# Patient Record
Sex: FEMALE | Race: WHITE | Employment: OTHER | URBAN - METROPOLITAN AREA
[De-identification: names, ages, dates, MRNs, and addresses within clinical notes are randomized per-mention and may not be internally consistent; named-entity substitution may affect disease eponyms.]

---

## 2020-05-03 ENCOUNTER — APPOINTMENT (OUTPATIENT)
Dept: CT IMAGING | Facility: CLINIC | Age: 85
End: 2020-05-03
Attending: EMERGENCY MEDICINE
Payer: COMMERCIAL

## 2020-05-03 ENCOUNTER — APPOINTMENT (OUTPATIENT)
Dept: GENERAL RADIOLOGY | Facility: CLINIC | Age: 85
End: 2020-05-03
Attending: EMERGENCY MEDICINE
Payer: COMMERCIAL

## 2020-05-03 ENCOUNTER — HOSPITAL ENCOUNTER (EMERGENCY)
Facility: CLINIC | Age: 85
Discharge: HOME | End: 2020-05-03
Attending: EMERGENCY MEDICINE | Admitting: EMERGENCY MEDICINE
Payer: COMMERCIAL

## 2020-05-03 VITALS
OXYGEN SATURATION: 93 % | DIASTOLIC BLOOD PRESSURE: 78 MMHG | RESPIRATION RATE: 16 BRPM | SYSTOLIC BLOOD PRESSURE: 143 MMHG | TEMPERATURE: 97.4 F | HEART RATE: 76 BPM

## 2020-05-03 DIAGNOSIS — S10.93XA CONTUSION OF NECK, INITIAL ENCOUNTER: ICD-10-CM

## 2020-05-03 DIAGNOSIS — S00.03XA SCALP HEMATOMA, INITIAL ENCOUNTER: ICD-10-CM
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
## 2020-05-03 DIAGNOSIS — S40.011A CONTUSION OF RIGHT SHOULDER, INITIAL ENCOUNTER: ICD-10-CM

## 2020-05-03 LAB — INR PPP: 2.11 (ref 0.86–1.14)

## 2020-05-03 PROCEDURE — 99285 EMERGENCY DEPT VISIT HI MDM: CPT | Mod: 25

## 2020-05-03 PROCEDURE — 25000132 ZZH RX MED GY IP 250 OP 250 PS 637: Performed by: EMERGENCY MEDICINE

## 2020-05-03 PROCEDURE — 73030 X-RAY EXAM OF SHOULDER: CPT | Mod: RT

## 2020-05-03 PROCEDURE — 70450 CT HEAD/BRAIN W/O DYE: CPT

## 2020-05-03 PROCEDURE — 72125 CT NECK SPINE W/O DYE: CPT

## 2020-05-03 PROCEDURE — 85610 PROTHROMBIN TIME: CPT | Performed by: EMERGENCY MEDICINE

## 2020-05-03 RX ORDER — ACETAMINOPHEN 325 MG/1
650 TABLET ORAL ONCE
Status: COMPLETED | OUTPATIENT
Start: 2020-05-03 | End: 2020-05-03

## 2020-05-03 RX ADMIN — ACETAMINOPHEN 650 MG: 325 TABLET, FILM COATED ORAL at 10:36

## 2020-05-03 ASSESSMENT — ENCOUNTER SYMPTOMS
NECK PAIN: 1
ABDOMINAL PAIN: 0
NAUSEA: 0
ARTHRALGIAS: 1
VOMITING: 0

## 2020-05-03 NOTE — ED NOTES
Called patient's daughter, Kiah to give update. Kiah states she will give patient a ride back to her apartment.

## 2020-05-03 NOTE — DISCHARGE INSTRUCTIONS
Your CT scans and X ray did not show any injury today thankfully  You need to use ice and tylenol for pain  Limit heavy lifting and activity until you feel better  It will take a week for pain to go away   Please return to the ER if you feel worse or have severe headache, pain, numbness or tingling, or anything new

## 2020-05-03 NOTE — ED PROVIDER NOTES
History     Chief Complaint:  Fall    HPI   Marleny Palomares is a 89 year old female, with a history of CHF, hypertension, atrial fibrillation and mitral valve disorder, anticoagulated on warfarin, DNR/DNI status, who presents to the emergency department for evaluation of a fall. The patient reports she was walking towards her window this morning when she slipped, lost her balance and fell, landing on her right side. She endorses neck pain and right shoulder pain, along with bruises to the right side of her head and body. She denies any hip pain, chest pain, abdominal pain, nausea or vomiting. She denies any loss of consciousness with the incident.    Allergies:  Penicillins     Medications:    Atenolol  Lasix  Ativan  Losartan  Tramadol  Warfarin  Prozac    Past Medical History:    Hypertension  Pneumonia  Anxiety  Insomnia  CHF  Chronic dizziness  Osteopenia  Mild carotid artery disease  Atrial fibrillation  Breast cancer  Mitral valve disorder    Past Surgical History:    Mitral valve replacement  Appendectomy  Cholecystectomy  Cataract removal  Mitral valvuloplasty  Thoracentesis  Pacemaker placement  Cervical fusion  Total knee arthroplasty  Breast lumpectomy  Hip replacement    Family History:    MI  Breast cancer  Stroke  Atrial fibrillation  Migraines  Macular degeneration    Social History:  Smoking status: Never  Alcohol use: No  The patient presents to the emergency department by herself.  PCP: Jaswinder Pandey  Marital Status:   [5]     Review of Systems   Cardiovascular: Negative for chest pain.   Gastrointestinal: Negative for abdominal pain, nausea and vomiting.   Musculoskeletal: Positive for arthralgias (right shoulder) and neck pain.   All other systems reviewed and are negative.      Physical Exam   Patient Vitals for the past 24 hrs:   BP Temp Temp src Pulse Resp SpO2   05/03/20 1045 -- -- -- -- -- 93 %   05/03/20 1030 (!) 143/78 -- -- 76 -- 95 %   05/03/20 1015 137/83 -- -- 85 -- --    05/03/20 1000 -- -- -- -- -- 99 %   05/03/20 0945 -- -- -- -- -- 97 %   05/03/20 0920 (!) 143/78 97.4  F (36.3  C) Oral 87 16 96 %   05/03/20 0915 (!) 143/78 -- -- -- -- --     Physical Exam  GEN- alert, cooperative  HEENT- PERRL, EOMI, MMM, oral pharynx without abnormalities, no dental injuries, midface stable, TM's clear bilaterally, small posterior scalp hematoma  NECK- ROM, soft, supple, mild lower midline C spine tenderness to palpation, no abrasions  RESP- CTAB, no w/r/r, chest wall nontender, no crepitus, symmetrical chest wall movement  CV- RRR, no m/r/g  ABD- soft, NT/ND, +BS  MSK- normal ROM in all extremities, pelvis stable to AP and lateral compression, no T and L spinal tenderness in the midline, 5/5 strength in all extremities. R shoulder TTP without deformity  NEURO- GCS 15, speech normal, alert, 5/5 strength x 4, sensation to light touch intact in all extremities,  strong bilaterally  SKIN- no rash, bruising on left neck from recent dental surgery, no ecchymosis, no abrasion  PSYCH- normal mood    Emergency Department Course   Imaging:  Radiology findings were communicated with the patient who voiced understanding of the findings.    CT Head w/o Contrast  IMPRESSION:     1. No evidence of acute intracranial hemorrhage, mass, or herniation.  2. There is generalized atrophy of the brain. White matter changes are  present in the cerebral hemispheres that are consistent with small  vessel ischemic disease in this age patient.  As read by Radiology.    CT Cervical Spine w/o Contrast  IMPRESSION:   1. No definite acute fracture or subluxation to the cervical spine.  2. Surgical screw through the C2 vertebral body through what appears  to be a chronic fracture through the base of the dens. No evidence of  fusion across the dens fracture.  3. Multilevel degenerative changes throughout the cervical spine.  As read by Radiology.    XR Shoulder Right G/E 3 Views  Findings:   Degenerative change at the  glenohumeral joint. No evidence for  fracture or dislocation. Hypertrophic change of the clavicular joint.  The right clavicle is negative for fracture. Sternotomy with cardiac  pacer wires incompletely visualized. No scapular fracture is  identified.  As read by Radiology.    Laboratory:  Laboratory findings were communicated with the patient who voiced understanding of the findings.    INR: 2.11 (H)     Interventions:  1036 Tylenol 650 mg PO    Emergency Department Course:  Patient arrived to the emergency department via EMS with a cervical collar in place.    Past medical records, nursing notes, and vitals reviewed.  0921: I performed an exam of the patient and obtained history, as documented above.     IV inserted and blood drawn.    The patient was sent for a head and cervical spine CT and right shoulder x-ray while in the emergency department, results above.     1035: I rechecked the patient. I explained findings to the patient. Cervical collar was removed secondary to negative CT scan.    I reviewed the results with the Patient and answered all related questions prior to discharge.     Findings and plan explained to the Patient. Patient discharged home with instructions regarding supportive care, medications, and reasons to return. The importance of close follow-up was reviewed.   Impression & Plan   Medical Decision Making:  Patient presents after a fall where she was walking towards a window to open it. She did not have any loss of consciousness but did have a hematoma to the back of her head. She is on Coumadin, therefore a CT was performed. Patient thankfully had no injuries and her INR was 2.11 today. She was given Tylenol after we took off her cervical collar. We did discuss her results with her family. Patient is discharged to the care of her daughter who will take her home. Patient was ambulatory and therefore was discharged.    Diagnosis:    ICD-10-CM   1. Contusion of right shoulder, initial  encounter  S40.011A   2. Contusion of neck, initial encounter  S10.93XA   3. Scalp hematoma, initial encounter  S00.03XA   4. Fall, initial encounter  W19.XXXA     Disposition:  Discharged to home.    Lora Aparicio  5/3/2020   [facility] EMERGENCY DEPARTMENT  Scribe Disclosure:  I, Lora Aparicio, am serving as a scribe at 9:21 AM on 5/3/2020 to document services personally performed by Huyen Watters MD based on my observations and the provider's statements to me.      Huyen Watters MD  05/03/20 9832

## 2020-05-03 NOTE — ED TRIAGE NOTES
89 year old female presents via EMS after fall from standing position. Patient states she lost her balance and denies chest pain, SOB, or dizziness prior to fall. ABCs intact. Patient in c-collar from EMS. Complains of head pain, posterior neck pain, and right shoulder pain.

## 2020-05-03 NOTE — ED NOTES
Bed: ED18  Expected date: 5/3/20  Expected time:   Means of arrival: Ambulance  Comments:  [id number] 89yo F fall

## 2020-09-25 ENCOUNTER — APPOINTMENT (OUTPATIENT)
Dept: GENERAL RADIOLOGY | Facility: CLINIC | Age: 85
DRG: 536 | End: 2020-09-25
Attending: EMERGENCY MEDICINE
Payer: COMMERCIAL

## 2020-09-25 ENCOUNTER — HOSPITAL ENCOUNTER (INPATIENT)
Facility: CLINIC | Age: 85
LOS: 1 days | Discharge: ANOTHER HOSPITAL | DRG: 536 | End: 2020-09-27
Attending: EMERGENCY MEDICINE | Admitting: INTERNAL MEDICINE
Payer: COMMERCIAL

## 2020-09-25 ENCOUNTER — APPOINTMENT (OUTPATIENT)
Dept: CT IMAGING | Facility: CLINIC | Age: 85
DRG: 536 | End: 2020-09-25
Attending: EMERGENCY MEDICINE
Payer: COMMERCIAL

## 2020-09-25 DIAGNOSIS — S32.592A PUBIC RAMUS FRACTURE, LEFT, CLOSED, INITIAL ENCOUNTER (H): ICD-10-CM

## 2020-09-25 DIAGNOSIS — R26.2 UNABLE TO AMBULATE: ICD-10-CM

## 2020-09-25 DIAGNOSIS — Z79.01 ANTICOAGULATED WITH WARFARIN: ICD-10-CM

## 2020-09-25 DIAGNOSIS — R55 SYNCOPE, UNSPECIFIED SYNCOPE TYPE: ICD-10-CM

## 2020-09-25 LAB
ALBUMIN SERPL-MCNC: 4.1 G/DL (ref 3.4–5)
ALP SERPL-CCNC: 86 U/L (ref 40–150)
ALT SERPL W P-5'-P-CCNC: 23 U/L (ref 0–50)
ANION GAP SERPL CALCULATED.3IONS-SCNC: 5 MMOL/L (ref 3–14)
AST SERPL W P-5'-P-CCNC: 35 U/L (ref 0–45)
BASOPHILS # BLD AUTO: 0 10E9/L (ref 0–0.2)
BASOPHILS NFR BLD AUTO: 0.3 %
BILIRUB SERPL-MCNC: 0.9 MG/DL (ref 0.2–1.3)
BUN SERPL-MCNC: 19 MG/DL (ref 7–30)
CALCIUM SERPL-MCNC: 9.4 MG/DL (ref 8.5–10.1)
CHLORIDE SERPL-SCNC: 106 MMOL/L (ref 94–109)
CO2 SERPL-SCNC: 28 MMOL/L (ref 20–32)
CREAT SERPL-MCNC: 0.7 MG/DL (ref 0.52–1.04)
DIFFERENTIAL METHOD BLD: ABNORMAL
EOSINOPHIL # BLD AUTO: 0.1 10E9/L (ref 0–0.7)
EOSINOPHIL NFR BLD AUTO: 1.6 %
ERYTHROCYTE [DISTWIDTH] IN BLOOD BY AUTOMATED COUNT: 15.4 % (ref 10–15)
GFR SERPL CREATININE-BSD FRML MDRD: 74 ML/MIN/{1.73_M2}
GLUCOSE SERPL-MCNC: 96 MG/DL (ref 70–99)
HCT VFR BLD AUTO: 48.1 % (ref 35–47)
HGB BLD-MCNC: 14.7 G/DL (ref 11.7–15.7)
IMM GRANULOCYTES # BLD: 0 10E9/L (ref 0–0.4)
IMM GRANULOCYTES NFR BLD: 0.3 %
INR PPP: 2.01 (ref 0.86–1.14)
LACTATE BLD-SCNC: 0.9 MMOL/L (ref 0.7–2)
LYMPHOCYTES # BLD AUTO: 1.8 10E9/L (ref 0.8–5.3)
LYMPHOCYTES NFR BLD AUTO: 28.8 %
MAGNESIUM SERPL-MCNC: 2.3 MG/DL (ref 1.6–2.3)
MCH RBC QN AUTO: 27.5 PG (ref 26.5–33)
MCHC RBC AUTO-ENTMCNC: 30.6 G/DL (ref 31.5–36.5)
MCV RBC AUTO: 90 FL (ref 78–100)
MONOCYTES # BLD AUTO: 1 10E9/L (ref 0–1.3)
MONOCYTES NFR BLD AUTO: 15.5 %
NEUTROPHILS # BLD AUTO: 3.3 10E9/L (ref 1.6–8.3)
NEUTROPHILS NFR BLD AUTO: 53.5 %
NRBC # BLD AUTO: 0 10*3/UL
NRBC BLD AUTO-RTO: 0 /100
PLATELET # BLD AUTO: 233 10E9/L (ref 150–450)
POTASSIUM SERPL-SCNC: 4.1 MMOL/L (ref 3.4–5.3)
PROT SERPL-MCNC: 8 G/DL (ref 6.8–8.8)
RBC # BLD AUTO: 5.35 10E12/L (ref 3.8–5.2)
SODIUM SERPL-SCNC: 139 MMOL/L (ref 133–144)
TROPONIN I SERPL-MCNC: 0.02 UG/L (ref 0–0.04)
WBC # BLD AUTO: 6.2 10E9/L (ref 4–11)

## 2020-09-25 PROCEDURE — 85610 PROTHROMBIN TIME: CPT | Performed by: EMERGENCY MEDICINE

## 2020-09-25 PROCEDURE — 96374 THER/PROPH/DIAG INJ IV PUSH: CPT

## 2020-09-25 PROCEDURE — 93005 ELECTROCARDIOGRAM TRACING: CPT

## 2020-09-25 PROCEDURE — 84484 ASSAY OF TROPONIN QUANT: CPT | Performed by: EMERGENCY MEDICINE

## 2020-09-25 PROCEDURE — 72125 CT NECK SPINE W/O DYE: CPT

## 2020-09-25 PROCEDURE — 25800030 ZZH RX IP 258 OP 636: Performed by: EMERGENCY MEDICINE

## 2020-09-25 PROCEDURE — 73502 X-RAY EXAM HIP UNI 2-3 VIEWS: CPT

## 2020-09-25 PROCEDURE — 83605 ASSAY OF LACTIC ACID: CPT | Performed by: EMERGENCY MEDICINE

## 2020-09-25 PROCEDURE — 25000128 H RX IP 250 OP 636: Performed by: EMERGENCY MEDICINE

## 2020-09-25 PROCEDURE — 71045 X-RAY EXAM CHEST 1 VIEW: CPT

## 2020-09-25 PROCEDURE — 83735 ASSAY OF MAGNESIUM: CPT | Performed by: EMERGENCY MEDICINE

## 2020-09-25 PROCEDURE — 72072 X-RAY EXAM THORAC SPINE 3VWS: CPT

## 2020-09-25 PROCEDURE — 85025 COMPLETE CBC W/AUTO DIFF WBC: CPT | Performed by: EMERGENCY MEDICINE

## 2020-09-25 PROCEDURE — 72100 X-RAY EXAM L-S SPINE 2/3 VWS: CPT

## 2020-09-25 PROCEDURE — C9803 HOPD COVID-19 SPEC COLLECT: HCPCS

## 2020-09-25 PROCEDURE — 80053 COMPREHEN METABOLIC PANEL: CPT | Performed by: EMERGENCY MEDICINE

## 2020-09-25 PROCEDURE — 70450 CT HEAD/BRAIN W/O DYE: CPT

## 2020-09-25 PROCEDURE — 99285 EMERGENCY DEPT VISIT HI MDM: CPT | Mod: 25

## 2020-09-25 RX ORDER — LIDOCAINE 40 MG/G
CREAM TOPICAL
Status: DISCONTINUED | OUTPATIENT
Start: 2020-09-25 | End: 2020-09-26

## 2020-09-25 RX ORDER — HYDROMORPHONE HYDROCHLORIDE 1 MG/ML
0.5 INJECTION, SOLUTION INTRAMUSCULAR; INTRAVENOUS; SUBCUTANEOUS
Status: COMPLETED | OUTPATIENT
Start: 2020-09-25 | End: 2020-09-25

## 2020-09-25 RX ORDER — SODIUM CHLORIDE, SODIUM LACTATE, POTASSIUM CHLORIDE, CALCIUM CHLORIDE 600; 310; 30; 20 MG/100ML; MG/100ML; MG/100ML; MG/100ML
INJECTION, SOLUTION INTRAVENOUS CONTINUOUS
Status: DISCONTINUED | OUTPATIENT
Start: 2020-09-25 | End: 2020-09-26

## 2020-09-25 RX ADMIN — SODIUM CHLORIDE, POTASSIUM CHLORIDE, SODIUM LACTATE AND CALCIUM CHLORIDE 250 ML: 600; 310; 30; 20 INJECTION, SOLUTION INTRAVENOUS at 23:56

## 2020-09-25 RX ADMIN — HYDROMORPHONE HYDROCHLORIDE 0.5 MG: 1 INJECTION, SOLUTION INTRAMUSCULAR; INTRAVENOUS; SUBCUTANEOUS at 23:02

## 2020-09-26 ENCOUNTER — APPOINTMENT (OUTPATIENT)
Dept: SPEECH THERAPY | Facility: CLINIC | Age: 85
DRG: 536 | End: 2020-09-26
Attending: INTERNAL MEDICINE
Payer: COMMERCIAL

## 2020-09-26 PROBLEM — S32.9XXA PELVIC FRACTURE (H): Status: ACTIVE | Noted: 2020-09-26

## 2020-09-26 LAB
ALBUMIN UR-MCNC: NEGATIVE MG/DL
ANION GAP SERPL CALCULATED.3IONS-SCNC: 6 MMOL/L (ref 3–14)
APPEARANCE UR: CLEAR
BILIRUB UR QL STRIP: NEGATIVE
BUN SERPL-MCNC: 14 MG/DL (ref 7–30)
CALCIUM SERPL-MCNC: 8.4 MG/DL (ref 8.5–10.1)
CHLORIDE SERPL-SCNC: 108 MMOL/L (ref 94–109)
CO2 SERPL-SCNC: 24 MMOL/L (ref 20–32)
COLOR UR AUTO: YELLOW
CREAT SERPL-MCNC: 0.53 MG/DL (ref 0.52–1.04)
CRP SERPL-MCNC: 15.1 MG/L (ref 0–8)
GFR SERPL CREATININE-BSD FRML MDRD: 81 ML/MIN/{1.73_M2}
GLUCOSE SERPL-MCNC: 87 MG/DL (ref 70–99)
GLUCOSE UR STRIP-MCNC: NEGATIVE MG/DL
HGB UR QL STRIP: NEGATIVE
INTERPRETATION ECG - MUSE: NORMAL
KETONES UR STRIP-MCNC: 10 MG/DL
LABORATORY COMMENT REPORT: NORMAL
LEUKOCYTE ESTERASE UR QL STRIP: ABNORMAL
MAGNESIUM SERPL-MCNC: 2.1 MG/DL (ref 1.6–2.3)
MUCOUS THREADS #/AREA URNS LPF: PRESENT /LPF
NITRATE UR QL: NEGATIVE
PH UR STRIP: 5.5 PH (ref 5–7)
POTASSIUM SERPL-SCNC: 4 MMOL/L (ref 3.4–5.3)
RBC #/AREA URNS AUTO: 2 /HPF (ref 0–2)
SARS-COV-2 RNA SPEC QL NAA+PROBE: NEGATIVE
SARS-COV-2 RNA SPEC QL NAA+PROBE: NORMAL
SODIUM SERPL-SCNC: 138 MMOL/L (ref 133–144)
SOURCE: ABNORMAL
SP GR UR STRIP: 1.02 (ref 1–1.03)
SPECIMEN SOURCE: NORMAL
SPECIMEN SOURCE: NORMAL
SQUAMOUS #/AREA URNS AUTO: 5 /HPF (ref 0–1)
TROPONIN I SERPL-MCNC: 0.02 UG/L (ref 0–0.04)
UROBILINOGEN UR STRIP-MCNC: NORMAL MG/DL (ref 0–2)
WBC #/AREA URNS AUTO: 4 /HPF (ref 0–5)

## 2020-09-26 PROCEDURE — 80048 BASIC METABOLIC PNL TOTAL CA: CPT | Performed by: INTERNAL MEDICINE

## 2020-09-26 PROCEDURE — 92610 EVALUATE SWALLOWING FUNCTION: CPT | Mod: GN | Performed by: SPEECH-LANGUAGE PATHOLOGIST

## 2020-09-26 PROCEDURE — 99222 1ST HOSP IP/OBS MODERATE 55: CPT | Performed by: INTERNAL MEDICINE

## 2020-09-26 PROCEDURE — 83735 ASSAY OF MAGNESIUM: CPT | Performed by: INTERNAL MEDICINE

## 2020-09-26 PROCEDURE — 25000132 ZZH RX MED GY IP 250 OP 250 PS 637: Performed by: INTERNAL MEDICINE

## 2020-09-26 PROCEDURE — 87070 CULTURE OTHR SPECIMN AEROBIC: CPT | Performed by: INTERNAL MEDICINE

## 2020-09-26 PROCEDURE — 36415 COLL VENOUS BLD VENIPUNCTURE: CPT | Performed by: INTERNAL MEDICINE

## 2020-09-26 PROCEDURE — 86140 C-REACTIVE PROTEIN: CPT | Performed by: INTERNAL MEDICINE

## 2020-09-26 PROCEDURE — 87040 BLOOD CULTURE FOR BACTERIA: CPT | Performed by: INTERNAL MEDICINE

## 2020-09-26 PROCEDURE — 12000000 ZZH R&B MED SURG/OB

## 2020-09-26 PROCEDURE — 84484 ASSAY OF TROPONIN QUANT: CPT | Performed by: INTERNAL MEDICINE

## 2020-09-26 PROCEDURE — 25800030 ZZH RX IP 258 OP 636: Performed by: EMERGENCY MEDICINE

## 2020-09-26 PROCEDURE — U0003 INFECTIOUS AGENT DETECTION BY NUCLEIC ACID (DNA OR RNA); SEVERE ACUTE RESPIRATORY SYNDROME CORONAVIRUS 2 (SARS-COV-2) (CORONAVIRUS DISEASE [COVID-19]), AMPLIFIED PROBE TECHNIQUE, MAKING USE OF HIGH THROUGHPUT TECHNOLOGIES AS DESCRIBED BY CMS-2020-01-R: HCPCS | Performed by: INTERNAL MEDICINE

## 2020-09-26 PROCEDURE — 81001 URINALYSIS AUTO W/SCOPE: CPT | Performed by: EMERGENCY MEDICINE

## 2020-09-26 RX ORDER — FUROSEMIDE 20 MG
20 TABLET ORAL 2 TIMES DAILY
Status: DISCONTINUED | OUTPATIENT
Start: 2020-09-26 | End: 2020-09-26

## 2020-09-26 RX ORDER — VITAMIN B COMPLEX
50 TABLET ORAL DAILY
Status: DISCONTINUED | OUTPATIENT
Start: 2020-09-26 | End: 2020-09-27 | Stop reason: HOSPADM

## 2020-09-26 RX ORDER — NALOXONE HYDROCHLORIDE 0.4 MG/ML
.1-.4 INJECTION, SOLUTION INTRAMUSCULAR; INTRAVENOUS; SUBCUTANEOUS
Status: DISCONTINUED | OUTPATIENT
Start: 2020-09-26 | End: 2020-09-27 | Stop reason: HOSPADM

## 2020-09-26 RX ORDER — ACETAMINOPHEN 500 MG
500 TABLET ORAL EVERY 4 HOURS PRN
COMMUNITY

## 2020-09-26 RX ORDER — VITAMIN B COMPLEX
25 TABLET ORAL DAILY
COMMUNITY

## 2020-09-26 RX ORDER — POTASSIUM CHLORIDE 750 MG/1
10 TABLET, EXTENDED RELEASE ORAL DAILY
COMMUNITY

## 2020-09-26 RX ORDER — OXYCODONE HYDROCHLORIDE 5 MG/1
5 TABLET ORAL
Status: DISCONTINUED | OUTPATIENT
Start: 2020-09-26 | End: 2020-09-26

## 2020-09-26 RX ORDER — FUROSEMIDE 40 MG
40 TABLET ORAL DAILY
Status: DISCONTINUED | OUTPATIENT
Start: 2020-09-26 | End: 2020-09-27

## 2020-09-26 RX ORDER — CALCIUM CARBONATE 500 MG/1
1 TABLET, CHEWABLE ORAL 2 TIMES DAILY
COMMUNITY

## 2020-09-26 RX ORDER — ONDANSETRON 2 MG/ML
4 INJECTION INTRAMUSCULAR; INTRAVENOUS EVERY 6 HOURS PRN
Status: DISCONTINUED | OUTPATIENT
Start: 2020-09-26 | End: 2020-09-27 | Stop reason: HOSPADM

## 2020-09-26 RX ORDER — ONDANSETRON 4 MG/1
4 TABLET, ORALLY DISINTEGRATING ORAL EVERY 6 HOURS PRN
Status: DISCONTINUED | OUTPATIENT
Start: 2020-09-26 | End: 2020-09-27 | Stop reason: HOSPADM

## 2020-09-26 RX ORDER — ACETAMINOPHEN 325 MG/1
975 TABLET ORAL 3 TIMES DAILY
Status: DISCONTINUED | OUTPATIENT
Start: 2020-09-26 | End: 2020-09-27 | Stop reason: HOSPADM

## 2020-09-26 RX ORDER — ATENOLOL 50 MG/1
50 TABLET ORAL 2 TIMES DAILY
Status: DISCONTINUED | OUTPATIENT
Start: 2020-09-26 | End: 2020-09-27 | Stop reason: HOSPADM

## 2020-09-26 RX ORDER — LIDOCAINE 40 MG/G
CREAM TOPICAL
Status: DISCONTINUED | OUTPATIENT
Start: 2020-09-26 | End: 2020-09-27 | Stop reason: HOSPADM

## 2020-09-26 RX ORDER — LOSARTAN POTASSIUM 50 MG/1
50 TABLET ORAL DAILY
Status: DISCONTINUED | OUTPATIENT
Start: 2020-09-26 | End: 2020-09-26

## 2020-09-26 RX ORDER — LORAZEPAM 0.5 MG/1
0.5 TABLET ORAL EVERY 6 HOURS PRN
Status: DISCONTINUED | OUTPATIENT
Start: 2020-09-26 | End: 2020-09-27 | Stop reason: HOSPADM

## 2020-09-26 RX ORDER — HYDROMORPHONE HYDROCHLORIDE 1 MG/ML
.3-.5 INJECTION, SOLUTION INTRAMUSCULAR; INTRAVENOUS; SUBCUTANEOUS
Status: DISCONTINUED | OUTPATIENT
Start: 2020-09-26 | End: 2020-09-27 | Stop reason: HOSPADM

## 2020-09-26 RX ORDER — FUROSEMIDE 40 MG
40 TABLET ORAL 2 TIMES DAILY
COMMUNITY

## 2020-09-26 RX ORDER — CLINDAMYCIN HCL 300 MG
600 CAPSULE ORAL SEE ADMIN INSTRUCTIONS
COMMUNITY

## 2020-09-26 RX ORDER — WARFARIN SODIUM 4 MG/1
4 TABLET ORAL
Status: DISCONTINUED | OUTPATIENT
Start: 2020-09-26 | End: 2020-09-27

## 2020-09-26 RX ORDER — SENNOSIDES 8.6 MG
1 TABLET ORAL 2 TIMES DAILY PRN
COMMUNITY

## 2020-09-26 RX ORDER — AMOXICILLIN 250 MG
2 CAPSULE ORAL 2 TIMES DAILY PRN
Status: DISCONTINUED | OUTPATIENT
Start: 2020-09-26 | End: 2020-09-27 | Stop reason: HOSPADM

## 2020-09-26 RX ORDER — AMOXICILLIN 250 MG
1 CAPSULE ORAL 2 TIMES DAILY PRN
Status: DISCONTINUED | OUTPATIENT
Start: 2020-09-26 | End: 2020-09-27 | Stop reason: HOSPADM

## 2020-09-26 RX ORDER — WARFARIN SODIUM 2.5 MG/1
3.75 TABLET ORAL DAILY
COMMUNITY

## 2020-09-26 RX ORDER — TRAMADOL HYDROCHLORIDE 50 MG/1
50 TABLET ORAL EVERY 6 HOURS PRN
Status: DISCONTINUED | OUTPATIENT
Start: 2020-09-26 | End: 2020-09-26

## 2020-09-26 RX ADMIN — LORAZEPAM 0.5 MG: 0.5 TABLET ORAL at 14:01

## 2020-09-26 RX ADMIN — ACETAMINOPHEN 975 MG: 325 TABLET, FILM COATED ORAL at 09:51

## 2020-09-26 RX ADMIN — LORAZEPAM 0.5 MG: 0.5 TABLET ORAL at 04:14

## 2020-09-26 RX ADMIN — SODIUM CHLORIDE, POTASSIUM CHLORIDE, SODIUM LACTATE AND CALCIUM CHLORIDE: 600; 310; 30; 20 INJECTION, SOLUTION INTRAVENOUS at 00:10

## 2020-09-26 RX ADMIN — OXYCODONE HYDROCHLORIDE 5 MG: 5 TABLET ORAL at 10:07

## 2020-09-26 ASSESSMENT — ACTIVITIES OF DAILY LIVING (ADL)
ADLS_ACUITY_SCORE: 30
ADLS_ACUITY_SCORE: 30
ADLS_ACUITY_SCORE: 26
ADLS_ACUITY_SCORE: 26
ADLS_ACUITY_SCORE: 28

## 2020-09-26 ASSESSMENT — MIFFLIN-ST. JEOR: SCORE: 1045.45

## 2020-09-26 NOTE — PLAN OF CARE
"Pt admitted with a fall in her independent living facility. L hip fracture noted, pt declined any pain management. Offered analgesics and ice, declined. Pain 5/10. BP (!) 167/85   Pulse 96   Temp 97.5  F (36.4  C) (Oral)   Resp 15   Ht 1.651 m (5' 5\")   Wt 64 kg (141 lb)   SpO2 94%   BMI 23.46 kg/m   2 LPM applied for O2 sats of 89% on RA. Now 94% on 2 LPM. Pt A&O x4. Pt had a 50 beat run of NICHOLAS sifuentes MD notified. Tele otherwise A fib CVR. Ativan given x1 for reported anxiety. Pt to be seen by PT and Ortho. Will continue with poc.  "

## 2020-09-26 NOTE — H&P
[facility]  Hospitalist Admission Note  September 26, 2020  Name: Marleny Palomares    MRN: [id number]  YOB: 1926    Age: 89 year old  Date of admission: 9/25/2020  Primary care provider: Dinesh Smith      Summary:  Patient is a 89-year-old female with a history of hypertension, chronic atrial fibrillation on anticoagulation with Coumadin, generalized anxiety, who presented to us with a fall event.  Patient reports that she used to have problems with falling but has not fallen in a while.  Patient does recall the events of her fall which includes standing in the kitchen and taking the Tylenol out of the cupboard.  She remembers twisting the cap and then ended up falling down.  She reports losing her balance but did not have any other prodromal symptoms such as chest pain, palpitation, or shortness of breath.  She remembers the fall and ended up on the ground.  She lives in independent living and was able to activate her [facility] to get help.  EMS was called and she was brought in.  She was placed in a c-collar.  She had a trauma series work-up including chest x-ray, lumbar x-ray, pelvis, and thoracic x-ray.  CT of the head and C-spine was negative.  Only significant findings was positive for a left pelvic superior rami fracture.  She does come to us from independent living and will likely need to be dispositioned to TCU once pain is better controlled.  I was asked to admit her for further inpatient cares, PT, and orthopedic consultation.     Problem list/Plan:    Fall event, appears mechanical with the left superior pubic rami fracture  -Inpatient admission for better pain control, orthopedic consultation for weightbearing and need for follow-up  -PT consultation once weightbearing status has been cleared by orthopedic  -Pain control with scheduled Tylenol 975 mg p.o. 3 times daily and will have PRN oxycodone and Dilaudid available    Hypertension  -Continue  atenolol 50 mg p.o. twice daily, losartan 50 mg p.o. daily, and Lasix 20 mg p.o. twice daily    Chronic atrial fibrillation  Still on anticoagulation but may need to be reassessed based on fall risk  -Continue Coumadin per pharmacy for now until further fall risk assessment  -Atenolol for rate control      -Additional workup plan which will include orthopedic consultation    All lab work and imaging data independently reviewed by myself    Prophylaxis;  Coumadin/Ambulation.     Code status: Full code    Discharge: To be determined based on orthopedic recommendations and PT assessment    Chief Complaint:   Fall event   HPI  Patient is a 89-year-old female with a history of hypertension, chronic atrial fibrillation on anticoagulation with Coumadin, generalized anxiety, who presented to us with a fall event.  Patient reports that she used to have problems with falling but has not fallen in a while.  Patient does recall the events of her fall which includes standing in the kitchen and taking the Tylenol out of the cupboard.  She remembers twisting the cap and then ended up falling down.  She reports losing her balance but did not have any other prodromal symptoms such as chest pain, palpitation, or shortness of breath.  She remembers the fall and ended up on the ground.  She lives in independent living and was able to activate her [facility] to get help.  EMS was called and she was brought in.  She was placed in a c-collar.  She had a trauma series work-up including chest x-ray, lumbar x-ray, pelvis, and thoracic x-ray.  CT of the head and C-spine was negative.  Only significant findings was positive for a left pelvic superior rami fracture.  She does come to us from independent living and will likely need to be dispositioned to TCU once pain is better controlled.  I was asked to admit her for further inpatient cares, PT, and orthopedic consultation.    I did discuss the case in detail with the ED physician.     Past  "Medical History:     Past Medical History:   Diagnosis Date     Anxiety      HTN (hypertension)      Insomnia    Chronic atrial fibrillation on anticoagulation  Past Surgical History:   No past surgical history on file.  Social History: Patient lives by herself       Family History:  Family history reviewed. NO pertinent family history     Allergies:     Allergies   Allergen Reactions     Penicillins Hives     Medications:     Medications Prior to Admission   Medication Sig Dispense Refill Last Dose     acetaminophen (TYLENOL) 325 MG tablet Take 2 tablets (650 mg) by mouth every 4 hours as needed for mild pain        atenolol (TENORMIN) 50 MG tablet Take 50 mg by mouth 2 times daily        furosemide (LASIX) 20 MG tablet Take 20 mg by mouth 2 times daily        LORazepam (ATIVAN) 0.5 MG tablet Take 1 tablet (0.5 mg) by mouth every 6 hours as needed for anxiety 30 tablet 0      LORazepam (ATIVAN) 0.5 MG tablet Take 0.5 mg by mouth every 6 hours as needed for anxiety        losartan (COZAAR) 50 MG tablet Take 50 mg by mouth daily        ondansetron (ZOFRAN-ODT) 4 MG disintegrating tablet Take 1 tablet (4 mg) by mouth every 6 hours as needed for nausea 120 tablet       traMADol (ULTRAM) 50 MG tablet Take 50 mg by mouth every 6 hours as needed for moderate pain        vitamin D (ERGOCALCIFEROL) 95727 UNIT capsule Take 50,000 Units by mouth once a week on Thursday        Warfarin Sodium (COUMADIN PO) Take by mouth See Admin Instructions          Review of Systems:   A Comprehensive greater than 10 system review of systems was carried out.  Pertinent positives and negatives are noted above.  Otherwise negative for contributory information.        Physical Exam:  Blood pressure (!) 167/85, pulse 96, temperature 97.5  F (36.4  C), temperature source Oral, resp. rate 15, height 1.651 m (5' 5\"), weight 64 kg (141 lb), SpO2 91 %.  Gen: Alert, appears in some moderate discomfort secondary to pain but in no acute " distress.  HEENT: NCAT. EOMI. PERRL.  Neck: Normal inspection. No bruit, JVD or thyromegaly.  Lungs: Normal respiratory effort. Clear to auscultation bilaterally with no crackles or wheezes.  Card: N s1s2. RRR. No M/R/G.  Peripheral pulses present and symmetric.   Skin: No rash. Warm to the touch  Extr: Left hip tender to palpation.  CMS intact  Psychiatric: Patient alert oriented ×3.  Normal affect  Neurologic: Cranial nerves II-XII are intact.  Sensation normal.  Motor strength 5/5    Data:     Recent Labs   Lab 09/25/20  2301   WBC 6.2   HGB 14.7   HCT 48.1*   MCV 90        Recent Labs   Lab 09/25/20 2301      POTASSIUM 4.1   CHLORIDE 106   CO2 28   ANIONGAP 5   GLC 96   BUN 19   CR 0.70   GFRESTIMATED 74   GFRESTBLACK 86   ARIELLA 9.4   MAG 2.3   PROTTOTAL 8.0   ALBUMIN 4.1   BILITOTAL 0.9   ALKPHOS 86   AST 35   ALT 23     Recent Labs   Lab 09/25/20 2301   INR 2.01*       Imaging:   Recent Results (from the past 24 hour(s))   CT Cervical Spine w/o Contrast    Narrative    EXAM: CT CERVICAL SPINE W/O CONTRAST  LOCATION: [facility]  DATE/TIME: 9/25/2020 11:09 PM    INDICATION: Status post fall with neck pain.  COMPARISON: 05/03/2020.  TECHNIQUE: CT cervical spine without contrast. Dose reduction techniques were performed.    FINDINGS: There is good anatomic alignment of the C1 and C2 vertebral bodies and also with the occipital condyles. The prevertebral soft tissues and the predental space are maintained. There is a stable slight retrolisthesis of C4 in relation to C5 with   the rest of the cervical spine having good anatomic alignment. The vertebral body heights are well-maintained throughout. The patient is status post screw fixation of the type II odontoid fracture with the fracture alignment maintained. There is no   significant callous formation along the fracture and there is further lucency surrounding the screw. There is no evidence of an acute cervical spine fracture. There is  prominent degenerative disc disease from the C2-C3 to the C6-C7 disc space level.   These levels have a significant loss of disc space height, endplate changes and small anterior osteophyte formations. There is diffuse facet arthropathy throughout the cervical region. There is no significant canal compromise or significant neural   foraminal narrowing throughout the cervical region. The lung apices are clear. The paraspinal soft tissues are unremarkable.      Impression    IMPRESSION:  1. No evidence of an acute cervical spine fracture.  2. Alignment and vertebral body heights stable in the interval.  3. No significant canal compromise or neural foraminal narrowing throughout the cervical region.  4. 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 There is a lucency surrounding the screw. Recommend clinical correlation for loosening.   CT Head w/o Contrast    Narrative    EXAM: CT HEAD W/O CONTRAST  LOCATION: [facility]  DATE/TIME: 9/25/2020 11:05 PM    INDICATION: Head trauma  COMPARISON: 09/21/2020.  TECHNIQUE: Routine without IV contrast. Multiplanar reformats. Dose reduction techniques were used.    FINDINGS:  INTRACRANIAL CONTENTS: No intracranial hemorrhage, extraaxial collection, or mass effect.  No CT evidence of acute infarct. There is diffuse scattered low-attenuation within the periventricular and subcortical white matter consistent with diffuse small   vessel ischemic disease. The ventricular system, basal cisterns and the cortical sulci are consistent with diffuse volume loss.      VISUALIZED ORBITS/SINUSES/MASTOIDS: No intraorbital abnormality. No paranasal sinus mucosal disease. No middle ear or mastoid effusion.    BONES/SOFT TISSUES: No acute abnormality.      Impression    IMPRESSION:  1.  No CT finding of a mass, hemorrhage or focal area suggestive of acute infarct.  2.  Diffuse age related changes.   XR Chest 1 View    Narrative    EXAM: XR CHEST 1 VW  LOCATION:  [facility]  DATE/TIME: 9/25/2020 11:19 PM    INDICATION: Injury. Pain  COMPARISON: 06/07/2019      Impression    IMPRESSION: Cardiac enlargement. Median sternotomy and cardiac valve replacement. Dual cardiac leads. Chronic right basilar infiltrate and chronic right costophrenic angle blunting. Appearance similar to previous.   XR Pelvis and Hip Left 2 Views    Narrative    EXAM: XR PELVIS AND HIP LEFT 2 VIEWS  LOCATION: [facility]  DATE/TIME: 9/25/2020 11:19 PM    INDICATION: Pain.  COMPARISON: 12/17/2016      Impression    IMPRESSION: Left hip arthroplasty. Fracture of the left superior pubic ramus. CT correlation to evaluate for additional fracture recommended. Degenerative changes. Vascular calcification.   Thoracic spine XR, 3 views    Narrative    EXAM: XR THORACIC SPINE 3 VW  LOCATION: [facility]  DATE/TIME: 9/25/2020 11:18 PM    INDICATION: Trauma. Fall. Back pain.  COMPARISON: CT chest dated 02/16/2018.      Impression    IMPRESSION: Decreased osseous mineralization. Within the confines of this examination: Subtle superior endplate compression deformity noted involving the presumed T12 vertebral body, age indeterminate. Alignment is maintained. The remaining vertebral   body heights are maintained. No acute malalignment or acute displaced fracture identified. Multilevel degenerative changes are present involving thoracic line. The patient is status post median sternotomy and valve repair. A left-sided dual-lead AICD is   present.    Lumbar spine XR, 2-3 views    Narrative    EXAM: XR LUMBAR SPINE 2-3 VIEWS  LOCATION: [facility]  DATE/TIME: 9/25/2020 11:26 PM    INDICATION: Fall. Trauma.  COMPARISON: None.      Impression    IMPRESSION: Decreased osseous mineralization. There is a subtle fracture involving the left superior pubic ramus. The patient is status post left hip arthroplasty. Degenerative changes are noted involving the right hip. Advanced  multilevel degenerative   changes are noted involving the lumbar spine. Additionally there is bilateral sacroiliac joint osteoarthritis. There is approximately 5 mm of retrolisthesis of L1 on L2, chronic and degenerative. Vertebral body heights are maintained. No acute fracture   is identified. Atheromatous vascular calcification of abdominal aorta.       Jasper James MD Pager [phone]

## 2020-09-26 NOTE — CONSULTS
Inpatient Cardiology Consultation   [facility]  Date of Admission:9/25/2020  Date of Consult: 9/26/2020    Inpatient cardiology consultation note has been dictated. Dictation number [id number]        Samantha Escalante MD [facility]  Cardiology              REVIEW OF SYSTEMS:  A comprehensive 10 point review of systems was completed and the pertinent positives are documented in history of present illness.    MEDICATIONS:  Prior to Admission Medications   Prescriptions Last Dose Informant Patient Reported? Taking?   LORazepam (ATIVAN) 0.5 MG tablet   Yes No   Sig: Take 0.5 mg by mouth every 6 hours as needed for anxiety   Vitamin D3 (CHOLECALCIFEROL) 25 mcg (1000 units) tablet 9/25/2020 at Unknown time  Yes Yes   Sig: Take 25 mcg by mouth daily   acetaminophen (TYLENOL) 500 MG tablet 9/24/2020 at Unknown time  Yes Yes   Sig: Take 500 mg by mouth every 4 hours as needed for mild pain    atenolol (TENORMIN) 50 MG tablet 9/25/2020 at Unknown time  Yes Yes   Sig: Take 50 mg by mouth 2 times daily   calcium carbonate (TUMS) 500 MG chewable tablet 9/25/2020 at Unknown time  Yes Yes   Sig: Take 1 chew tab by mouth 2 times daily   clindamycin (CLEOCIN) 300 MG capsule   Yes Yes   Sig: Take 600 mg by mouth See Admin Instructions 30-60 mins prior to dental appointment   furosemide (LASIX) 40 MG tablet 9/25/2020 at Unknown time  Yes Yes   Sig: Take 40 mg by mouth 2 times daily If wt<140#s, then pt takes 40mg po daily   potassium chloride ER (KLOR-CON M) 10 MEQ CR tablet 9/25/2020 at Unknown time  Yes Yes   Sig: Take 10 mEq by mouth daily   sennosides (SENOKOT) 8.6 MG tablet Past Week at Unknown time  Yes Yes   Sig: Take 1 tablet by mouth 2 times daily as needed for constipation   warfarin ANTICOAGULANT (COUMADIN) 2.5 MG tablet 9/24/2020 at Unknown time  Yes Yes   Sig: Take 3.75 mg by mouth daily      Facility-Administered Medications: None       ALLERGIES:  Allergies   Allergen Reactions     Penicillins Hives       PAST  MEDICAL HISTORY:  Past Medical History:   Diagnosis Date     Anxiety      HTN (hypertension)      Insomnia        PAST SURGICAL HISTORY:  As documented.    SOCIAL HISTORY:   Marleny Palomares      FAMILY HISTORY:  No family history on file.    PHYSICAL EXAMINATION:  Temp: 97.4  F (36.3  C) Temp src: Oral BP: 126/84 Pulse: 76   Resp: 16 SpO2: 92 % O2 Device: Nasal cannula Oxygen Delivery: 2 LPM  09/21 1500 - 09/26 1459  In: 360 [P.O.:360]  Out: -   Net: 360  Vitals:    09/26/20 0244   Weight: 64 kg (141 lb)         Samantha Escalante MD

## 2020-09-26 NOTE — PHARMACY-ANTICOAGULATION SERVICE
Clinical Pharmacy - Warfarin Dosing Consult     Pharmacy has been consulted to manage this patient s warfarin therapy.  Indication: Atrial Fibrillation  Therapy Goal: INR 2-3  Warfarin Prior to Admission: Yes  Warfarin PTA Regimen: 3.75 mg daily    INR   Date Value Ref Range Status   09/25/2020 2.01 (H) 0.86 - 1.14 Final   05/03/2020 2.11 (H) 0.86 - 1.14 Final       Recommend warfarin 4 mg today.  Pharmacy will monitor Marleny Palomares daily and order warfarin doses to achieve specified goal.      Please contact pharmacy as soon as possible if the warfarin needs to be held for a procedure or if the warfarin goals change.

## 2020-09-26 NOTE — PROGRESS NOTES
Provider paged: FYI: Pedal pulse not palpable L foot, able to assess by Doppler. Does not sound as strong as right. R foot is palpable 2+.

## 2020-09-26 NOTE — PHARMACY-ADMISSION MEDICATION HISTORY
Admission medication history interview status for this patient is complete. See [facility] admission navigator for allergy information, prior to admission medications and immunization status.     Medication history interview done via telephone during Covid-19 pandemic, indicate source(s): Patient  Medication history resources (including written lists, pill bottles, clinic record):[facility] list  Pharmacy: [facility]    Changes made to PTA medication list:  Added: tums, potassium, senna, clindamycin  Deleted: losartan, zofran, ultram  Changed: tylenol, Vit D, furosemide    Actions taken by pharmacist (provider contacted, etc):None     Additional medication history information:None    Medication reconciliation/reorder completed by provider prior to medication history?  Y   (Y/N)       Prior to Admission medications    Medication Sig Last Dose Taking? Auth Provider   acetaminophen (TYLENOL) 500 MG tablet Take 500 mg by mouth every 4 hours as needed for mild pain  9/24/2020 at Unknown time Yes Unknown, Entered By History   atenolol (TENORMIN) 50 MG tablet Take 50 mg by mouth 2 times daily 9/25/2020 at Unknown time Yes Unknown, Entered By History   calcium carbonate (TUMS) 500 MG chewable tablet Take 1 chew tab by mouth 2 times daily 9/25/2020 at Unknown time Yes Unknown, Entered By History   clindamycin (CLEOCIN) 300 MG capsule Take 600 mg by mouth See Admin Instructions 30-60 mins prior to dental appointment  Yes Unknown, Entered By History   furosemide (LASIX) 40 MG tablet Take 40 mg by mouth 2 times daily If wt<140#s, then pt takes 40mg po daily 9/25/2020 at Unknown time Yes Unknown, Entered By History   potassium chloride ER (KLOR-CON M) 10 MEQ CR tablet Take 10 mEq by mouth daily 9/25/2020 at Unknown time Yes Unknown, Entered By History   sennosides (SENOKOT) 8.6 MG tablet Take 1 tablet by mouth 2 times daily as needed for constipation Past Week at Unknown time Yes Unknown, Entered By History   Vitamin D3 (CHOLECALCIFEROL) 25  mcg (1000 units) tablet Take 25 mcg by mouth daily 9/25/2020 at Unknown time Yes Unknown, Entered By History   warfarin ANTICOAGULANT (COUMADIN) 2.5 MG tablet Take 3.75 mg by mouth daily 9/24/2020 at Unknown time Yes Unknown, Entered By History   LORazepam (ATIVAN) 0.5 MG tablet Take 0.5 mg by mouth every 6 hours as needed for anxiety   Unknown, Entered By History

## 2020-09-26 NOTE — PROGRESS NOTES
"SPIRITUAL HEALTH SERVICES: Tele-Encounter  Patient Location: Med/Surg 3rd floor  Spoke with: Patient    Referral Source: pt request at admission    DATA:  Facilitated conversation around illness narrative.  Patient stated she was \"having a hard time speaking because they think I might have had some mini strokes\".  Marleny expressed concern for her daughter, Irene, who has been getting \"worn out\" caring for the pt.  Pt shared she may have to move from independent living into assisted living.  She stated she is \"not too happy\" about this but expressed that it is probably the best move for her and Irene.  I normalized her feelings of sadness about this possible move and the difficulty of her role reversing from someone who cared for other people to being the one who is \"served\".  Pt identifies as Temple and sees God as a source of comfort and strength.  Explored what the \"new normal\" might be for Darling when she leaves the hospital.  Darling welcomed a time of prayer together.    PLAN:  Spiritual Health remains available for follow up as needed.      Robyn Coreasin Resident      ______________________________    Type of service:  Telephone Visit     has received verbal consent for a TelephoneVisit from the patient? Yes    Distance Provider Location: designated [place] office or home office (secure setting)    Mode of Communication: telephone (via [employer] phone or [employer] tele-call-number ([phone]))  "

## 2020-09-26 NOTE — ED PROVIDER NOTES
Visit Date:   09/25/2020      CHIEF COMPLAINT:  Fall.      HISTORY OF PRESENT ILLNESS:  This is a 89-year-old female who lives alone in her own home and reports that she falls a lot.  Today, she fell quite hard.  She never knows what precipitates her falls, but she did not feel lightheaded or dizzy before the fall, but she found herself on the floor.  She did hit her head but there was no loss of consciousness.  She is anticoagulated on Coumadin for atrial fibrillation.  The only pain that she really is in her left hip.      She pulled her [employer] cord and medics arrived.  Normally she says this happens quite frequently and she calls first responders who come to her home, help her get up because she normally cannot get herself off the floor because of a bad left knee and left hip.  She has a prosthesis on the left, but this time when they helped her up she said that she hurt quite badly in her left pelvic region and therefore she was brought in to the Department.      PAST MEDICAL HISTORY:  Significant for degenerative joint disease with hip replacement as mentioned as well as knee replacement.  She also has a history of hypertension, atrial fibrillation as mentioned on anticoagulation as well as pacemaker, mitral valve replacement.      SOCIAL HISTORY:  Lives alone as per above, has never smoked.  Denies alcohol.      REVIEW OF SYSTEMS:  She denies any headache.  She has felt well otherwise of recent.  She complains vigorously of left pelvic pain when I try to move the left lower extremity.  All other systems negative.      MEDICATIONS:  She is on losartan as well as atenolol, furosemide, lorazepam, tramadol, vitamin D, and Coumadin.      ALLERGIES:  SHE IS ALLERGIC TO PENICILLIN.      OBJECTIVE:   VITAL SIGNS:  She is afebrile at 97.5, heart rate in the 80s-107, respiratory rate 15-20, O2 sats 98% on room air.   CONSTITUTIONAL:  She appears her stated age.  She had a cervical collar in place when she arrived.    HEENT:  PERRLA, EOMI.  Head atraumatic.   NECK:  No spinous process tenderness in the midline.  There is no spinous process tenderness over the remainder of the back for that matter.   CARDIOVASCULAR:  Regular S1, S2, no murmur or added sounds.   RESPIRATORY:  No increased work of breathing.   LUNGS:  Clear.   GASTROINTESTINAL:  Abdomen is soft and nontender, but she is very obese.   MUSCULOSKELETAL:  No chest wall tenderness.  No sternal tenderness or clavicular tenderness bilaterally.  Shoulders move freely in all directions without deformity.  No effusions to the upper extremities.  No tenderness of the upper extremities.  She does have tenderness, however, in the left pelvic region, the inguinal crease.  When I lift the leg, she has severe spasm in this area.  The hip joint itself, though, moves freely without pain.  Same on the right side.  The left lower extremity is always larger than the right by her report on examination.  She has an old scar along the left hip region consistent with her total hip replacement.   NEUROLOGIC:  Mental status as above.  Cranial nerves 3-12 grossly intact.  There are no focal, motor or sensory deficits present.   SKIN:  Warm, pink and dry.  No abrasions or contusions noted.      EMERGENCY ROOM COURSE:  IV was established, lactated Ringers 300 mL bolus given and then run at 125 mL per hour.  She was given Dilaudid 0.5 mg IV 1.  Blood work shows uninfected looking urine without hematuria.  CBC with differential, essentially normal Coumadin therapeutic with an INR of 2.01.  Recent metabolic profile normal.  Lactic acid normal.  EKG reveals atrial fibrillation with occasional ventricular paced beats.  This is unchanged from previous EKGs.  Thoracic and lumbar spinal views showed no malalignment or acute displaced fractures.  There is a subtle endplate compression deformity noted involving the presumed T12 vertebral body that the radiologist listed as age indeterminate, however,  the patient had no spinous process tenderness or complaints of pain in that region on examination.  It was noted that she has a left-sided dual lead AICD in place.  On the lumbar spinal views, they were able to see the subtle fracture involving the left superior pubic ramus and the total hip noted as well and she also has bilateral sacroiliac osteoarthritis and she was tender in both those areas  on examination as well.  There are no other acute findings noted on the lumbosacral view.  Chest x-ray shows a median sternotomy and cardiac valve replacement, dual cardiac leads and chronic basilar infiltrates.      The patient wanted to try to walk, but she was unable to.  The only pain she had was when she moved and it was in that left inguinal area mentioned earlier.  The patient denies any other pain at this time.  She is being admitted for further evaluation and symptomatic support as well as likely ortho evaluation.  I spoke with Dr. James who is the admitting physician.      The patient refused a COVID swab.  We did discuss that if she needed surgery, this would delay her surgery and she refused it outright, she may never walk again without pain in this area.  She continues to be resistant to wanting to have that done.  She is, however, agreeable to admission.            ANDREZ SPENCE MD             D: 2020   T: 2020   MT:       Name:     SHAR BANSAL   MRN:      [id number]        Account:      [id number]   :      1926           Visit Date:   2020      Document: [id number]

## 2020-09-26 NOTE — PLAN OF CARE
Discharge Planner SLP   Patient plan for discharge: did not discuss  Current status: Orders received, chart reviewed, and phone call made to Kiah (SG). She reports that patient had trouble about two years ago with swallowing and was seen at [facility] by SLP. Patient had aspiration pneumonia, refused thickened liquids, went home on hospice but gradually improved. She reports that she has been on a regular diet with thin liquids since that time and only has minimal occasional coughing. The patient denies any past difficulty with swallowing. Patient assessed with thin liquids via cup and straw and did not demonstrate any overt s/sx of aspiration. Timely swallow response with adequate hyolaryngeal excursion. Declined any further trials of advanced textures.     Patient does endorse a gradual decline in her speech and states that she has slurred speech and has trouble getting her mouth to do what she wants. She also complains of a hoarse or raspy vocal quality. Oral motor exam revealed difficulty with general coordination, especially with rapid speech sounds.    Patient is uncertain if she wants to pursue further evaluation into her dysarthria. SLP will plan to see patient x1 session to assess tolerance of a regular diet with thin liquids. Will inquire at that time if she has thought any more about pursuing further work-up for her dysarthria.  Barriers to return to prior living situation: fall risk  Recommendations for discharge: defer to PT/OT for safety issues  Rationale for recommendations: Patient likely at her baseline for swallowing        Entered by: Jorge Hunt 09/26/2020 3:00 PM

## 2020-09-26 NOTE — PROGRESS NOTES
"Patient alert and oriented, forgetful at times.  Pain managed with scheduled tylenol and prn oxycodone x1.  PRN PO ativan x1 for anxiety with relief.  Regular diet, good appetite.  Up with assist of 2 gait belt and walker toe touch weight bearing.  Pedal pulse 2+ palpable to RLE, left assessed with doppler (not palpable) which sounded less significant than right, MD notified no new orders per verbal continue to monitor for worsening symptoms, notify provider if becomes worse.  Lung sounds dim/clear.  Bowel sounds active and audible.  Incontinent of urine.  Tele: A-fib CVR Inverted T's V-paced.  Pacemaker interrogated today, [employer] pacer. Dressing changed to wound below pacemaker.  Some bruising observed to left hip area, no edema, cap refill wnl, strong bilateral dorsi and plantar flexion.  Blanchable redness to buttocks.    /84 (BP Location: Right arm)   Pulse 76   Temp 97.4  F (36.3  C) (Oral)   Resp 16   Ht 1.651 m (5' 5\")   Wt 64 kg (141 lb)   SpO2 92%   BMI 23.46 kg/m    Will continue to monitor.   Covid-19 test pending, special precautions maintained.   "

## 2020-09-26 NOTE — ED NOTES
[facility]  ED Nurse Handoff Report    Marleny Palomares is a 89 year old female   ED Chief complaint: Fall (hip pain)  . ED Diagnosis:   Final diagnoses:   Syncope, unspecified syncope type   Unable to ambulate   Anticoagulated with warfarin   Pubic ramus fracture, left, closed, initial encounter (H)     Allergies:   Allergies   Allergen Reactions     Penicillins Hives       Code Status: Full Code  Activity level - Baseline/Home:  Assist X 1. Activity Level - Current:   Assist X 1. Lift room needed: No. Bariatric: No   Needed: No   Isolation: No. Infection: Not Applicable.     Vital Signs:   Vitals:    09/25/20 2243 09/25/20 2246   BP:  (!) 176/107   Pulse: 107    Resp: 18    Temp: 97.5  F (36.4  C)    TempSrc: Oral    SpO2: 96%        Cardiac Rhythm:  ,      Pain level: 0-10 Pain Scale: 5  Patient confused: No. Patient Falls Risk: Yes.   Elimination Status: pure wick in place   Patient Report - Initial Complaint: Pt to er via ambulance with report of being found down in her apartment with leg wedged in between counters, pt does not recall events prior to fall. c collar in place, pt does not know if struck head , reports pain in lumbar region and L hip. Alert & oriented x4. Airway, breathing and circulation intact without need for intervention. Focused Assessment: radiology, fluids, labs   Tests Performed:  Labs Ordered and Resulted from Time of ED Arrival Up to the Time of Departure from the ED   CBC WITH PLATELETS DIFFERENTIAL - Abnormal; Notable for the following components:       Result Value    RBC Count 5.35 (*)     Hematocrit 48.1 (*)     MCHC 30.6 (*)     RDW 15.4 (*)     All other components within normal limits   INR - Abnormal; Notable for the following components:    INR 2.01 (*)     All other components within normal limits   COMPREHENSIVE METABOLIC PANEL   MAGNESIUM   LACTIC ACID WHOLE BLOOD   TROPONIN I   ROUTINE UA WITH MICROSCOPIC REFLEX TO CULTURE   COVID-19 VIRUS  (CORONAVIRUS) BY PCR   PULSE OXIMETRY NURSING   CARDIAC CONTINUOUS MONITORING   PERIPHERAL IV CATHETER   ORTHOSTATIC BLOOD PRESSURE AND PULSE   ISTAT TROPONIN NURSING POCT    . Abnormal Results: as noted.   Treatments provided:   Lumbar spine XR, 2-3 views   Final Result   IMPRESSION: Decreased osseous mineralization. There is a subtle fracture involving the left superior pubic ramus. The patient is status post left hip arthroplasty. Degenerative changes are noted involving the right hip. Advanced multilevel degenerative    changes are noted involving the lumbar spine. Additionally there is bilateral sacroiliac joint osteoarthritis. There is approximately 5 mm of retrolisthesis of L1 on L2, chronic and degenerative. Vertebral body heights are maintained. No acute fracture    is identified. Atheromatous vascular calcification of abdominal aorta.      Thoracic spine XR, 3 views   Final Result   IMPRESSION: Decreased osseous mineralization. Within the confines of this examination: Subtle superior endplate compression deformity noted involving the presumed T12 vertebral body, age indeterminate. Alignment is maintained. The remaining vertebral    body heights are maintained. No acute malalignment or acute displaced fracture identified. Multilevel degenerative changes are present involving thoracic line. The patient is status post median sternotomy and valve repair. A left-sided dual-lead AICD is    present.       XR Pelvis and Hip Left 2 Views   Final Result   IMPRESSION: Left hip arthroplasty. Fracture of the left superior pubic ramus. CT correlation to evaluate for additional fracture recommended. Degenerative changes. Vascular calcification.      XR Chest 1 View   Final Result   IMPRESSION: Cardiac enlargement. Median sternotomy and cardiac valve replacement. Dual cardiac leads. Chronic right basilar infiltrate and chronic right costophrenic angle blunting. Appearance similar to previous.      CT Head w/o Contrast    Final Result   IMPRESSION:   1.  No CT finding of a mass, hemorrhage or focal area suggestive of acute infarct.   2.  Diffuse age related changes.      CT Cervical Spine w/o Contrast   Final Result   IMPRESSION:   1. No evidence of an acute cervical spine fracture.   2. Alignment and vertebral body heights stable in the interval.   3. No significant canal compromise or neural foraminal narrowing throughout the cervical region.   4. 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 There is a lucency surrounding the screw. Recommend clinical correlation for loosening.        Family Comments: n/a  OBS brochure/video discussed/provided to patient:  Yes  ED Medications:   Medications   lidocaine 1 % 0.1-1 mL (has no administration in time range)   lidocaine (LMX4) cream (has no administration in time range)   sodium chloride (PF) 0.9% PF flush 3 mL (has no administration in time range)   sodium chloride (PF) 0.9% PF flush 3 mL (has no administration in time range)   lactated ringers BOLUS 250 mL (0 mLs Intravenous Stopped 9/26/20 0010)     Followed by   lactated ringers infusion ( Intravenous New Bag 9/26/20 0010)   HYDROmorphone (PF) (DILAUDID) injection 0.5 mg (0.5 mg Intravenous Given 9/25/20 2302)     Drips infusing:  Yes  For the majority of the shift, the patient's behavior Green. Interventions performed were n/a.    Sepsis treatment initiated: No     Patient tested for COVID 19 prior to admission: NO, refused    ED Nurse Name/Phone Number: Brunilda Sawant RN,   1:11 AM    RECEIVING UNIT ED HANDOFF REVIEW    Above ED Nurse Handoff Report was reviewed: Yes  Reviewed by: Nena Roy RN on September 26, 2020 at 2:05 AM

## 2020-09-26 NOTE — ED TRIAGE NOTES
Pt to er via ambulance with report of being found down in her apartment with leg wedged in between counters, pt does not recall events prior to fall. c collar in place, pt does not know if struck head , reports pain in lumbar region and L hip. Alert & oriented x4. Airway, breathing and circulation intact without need for intervention

## 2020-09-26 NOTE — PROGRESS NOTES
"   Clinical Swallow Evaluation  [facility]  09/26/20 1511   General Information   Onset Date 09/25/20   Start of Care Date 09/26/20   Referring Physician Dr. Denis   Patient Profile Review/OT: Additional Occupational Profile Info See Profile for full history and prior level of function   Patient/Family Goals Statement Did not state   Swallowing Evaluation Bedside swallow evaluation   Behaviorial Observations WFL (within functional limits)   Mode of current nutrition Oral diet   Type of oral diet Regular;Thin liquid   Respiratory Status O2 Supply   Type of O2 supply Nasal cannula  (2L)   Comments \"speech/swallow issues pas two weeks; had head CT which was negative.\"  Past Medical History:   Diagnosis Date     Anxiety      HTN (hypertension)      Insomnia       Clinical Swallow Evaluation   Oral Musculature anomalies present   Structural Abnormalities none present   Dentition present and adequate   Mucosal Quality adequate   Mandibular Strength and Mobility intact   Oral Labial Strength and Mobility impaired coordination   Lingual Strength and Mobility impaired coordination   Velar Elevation intact   Buccal Strength and Mobility intact   Laryngeal Function Cough;Throat clear;Swallow;Voicing initiated;Dry swallow palpated   Clinical Swallow Eval: Thin Liquid Texture Trial   Mode of Presentation, Thin Liquids cup;straw;self-fed   Volume of Liquid or Food Presented 2 ounces   Oral Phase of Swallow WFL   Pharyngeal Phase of Swallow intact   Diagnostic Statement No overt s/sx of aspiration   Swallow Compensations   Swallow Compensations Reduce amounts   Results No difficulties noted   General Therapy Interventions   Planned Therapy Interventions Dysphagia Treatment   Dysphagia treatment Instruction of safe swallow strategies   Intervention Comments diet tolerance and strategy education   Swallow Eval: Clinical Impressions   Skilled Criteria for Therapy Intervention Skilled criteria met.  Treatment indicated.   Functional " Assessment Scale (FAS) 6   Dysphagia Outcome Severity Scale (ALEJANDRO) Level 6 - ALEJANDRO   Treatment Diagnosis history of dysphagia   Diet texture recommendations Regular diet;Thin liquids   Recommended Feeding/Eating Techniques maintain upright posture during/after eating for 30 mins;small sips/bites   Therapy Frequency 3x/week   Predicted Duration of Therapy Intervention (days/wks) One week   Anticipated Discharge Disposition   (uncertain-await PT/OT eval and input)   Risks and Benefits of Treatment have been explained. Yes   Patient, family and/or staff in agreement with Plan of Care Yes   Clinical Impression Comments Orders received, chart reviewed, and phone call made to Kiah ORONA). She reports that patient had trouble about two years ago with swallowing and was seen at [facility] by SLP. Patient had aspiration pneumonia, refused thickened liquids, went home on hospice but gradually improved. She reports that she has been on a regular diet with thin liquids since that time and only has minimal occasional coughing. The patient denies any past difficulty with swallowing. Patient assessed with thin liquids via cup and straw and did not demonstrate any overt s/sx of aspiration. Timely swallow response with adequate hyolaryngeal excursion. Declined any further trials of advanced textures. Dysarthria present and the bigger concern to patient.   Total Evaluation Time   Total Evaluation Time (Minutes) 23   Jorge Solorzano MS CCC-SLP

## 2020-09-26 NOTE — PROGRESS NOTES
Patient admitted by my colleague overnight.  Exam stable during my visit.  She had a fall with what appears to be a pelvic fracture.  She could not get up/walk after the fall.  She has had other falls the past year.  Her daughter has been working to encourage her to go to assisted living.  Since here, pain in her hip/pelvis is improved but she hasn't gotten up yet.  She also was noted to have drainage from her pacemaker site that has been present for some time (see discussion below).  In addition, she had what appeared to be some vtach on the tele monitor a few hours ago.  She also has had 4-6 weeks of SOB worse than prior.  Finally, she has had some speech/swallow issues the past few weeks and recently had an outpatient head CT for this.  She currently reports feeling better but frustrated with the fall.  She also refused covid testing initially.    Impression\Plan:    Mechanical Fall with pelvic fracture + hip pain:  -  Tylenol, narcotics PRN.  Ortho consult.  Therapy.  May need TCU (though she is already stating resistance to the idea of TCU)    Pacemaker with drainage  Episode of Vtach on tele  Chronic afib on anticoag:  -  Patient with months of drainage near pacemaker.  Apparently had the area drained and packed a couple months ago.  It improved but then started draining again more recently.  She was planned to see her PCP for it in the near future per her daughter.  I'm concerned this may represent a pocket of infection/fistula.  I have asked cardiology to assess today + pacemaker interrogation.  Culture drainage.  Hold on systemic abx for the moment as no overt systemic infection.    Acute on chronic SOB  Covid Screening:  -  Unclear etiology.  Plan TTE.  No overt pneumonia seen.  Unlikely covid given length of time but will screen.   She is now agreeable after her daughter also recommended she get it.  She is agreeable but asked I returned when she was screened and I asked the RN to page me when she is  ready for screening.  In the meanwhile will keep her on precautions.    Dysphagia, speech change:  -  Speech/swallow eval    ADDENDUM 1650:  Patient seen by cardiologist who also is concerned about appearance of pacemaker pocket and the drainage below site.  She recommends patient see electrophysiologist and have the area drained.  This would need to be at [facility] or [facility].  The patient and her daughter prefer [facility] as the patient gets her normal cardiology care there.  I will work on transferring her in the AM.  Currently still await pending covid test result.  No signs of systemic infection so I will hold off on IV abx for now.  I will send blood cultures.  If any fevers/signs of systemic infection develop I would add broad spectrum antibiotics.

## 2020-09-26 NOTE — PROGRESS NOTES
Orthopedics Progress Note    Patient seen and examined. High left superior ramus fracture extending near acetabulum. Patient will need to be toe touch weight bearing with walker for six weeks. Patient should begin Vitamin D 2000 U/day. Follow-up with Dr. Stinson in 2 weeks for imaging. TCU placement likely needed. Consultation dictated.     Malachi Whitney PA-C

## 2020-09-26 NOTE — CONSULTS
Consult Date:  09/26/2020      Inpatient orthopedic consultation is requested by Dr. Denis for the patient's left-sided pelvic fracture.      CHIEF COMPLAINT:  Pelvic pain and inability to bear weight following mechanical fall.      HISTORY OF PRESENT ILLNESS:  Marleny is a pleasant 89-year-old female who usually lives alone in her home and reported a fall yesterday on 09/25/2020.  Following the fall the patient was unable to bear weight and localized pain to her left hemipelvis and hip area.  She pulled her Lifeline cord and medics arrived at her home.  The patient was transported to the [facility] Emergency Department where x-ray imaging revealed a left superior ramus fracture adjacent to her left total hip arthroplasty.  The patient was then admitted for ongoing cares and orthopedic consultation.  At this time, the patient lies comfortably in her hospital bed.  She denies any other symptoms at this time except for left-sided pelvic pain.      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  History of knee replacement.  The patient also has a history of hypertension, atrial fibrillation, chronic anticoagulation, pacemaker implantation and mitral valve replacement.      SOCIAL HISTORY:  The patient does not smoke or use alcohol.  She does not use drugs.  She lives alone at her home.      FAMILY HISTORY:  Noncontributory to the current injury.      MEDICATIONS:  The patient takes losartan, atenolol, furosemide, lorazepam, Tramadol, vitamin D, and Coumadin.      ALLERGIES:  THE PATIENT IS ALLERGIC TO PENICILLIN.      REVIEW OF SYSTEMS:  The patient denies headache, changes in vision or hearing, neck pain, chest pain, shortness of breath, upper extremity pain, abdominal pain or pain down her lower legs.  She does admit to pain on the left side of her pelvis.      PHYSICAL EXAMINATION:  The patient is a  well-developed, well-nourished 89-year-old female who appears her stated age, in no acute distress while lying in her hospital bed.   VITAL SIGNS:  Temperature 97.5 Fahrenheit, pulse 84, blood pressure 134/66, respirations 18, SpO2 96% on 2 liters per minute oxygen delivered via nasal cannula.   HEENT:  The patient is normocephalic without significant trauma to the head.  No ecchymosis or laceration is noted.   NECK:  The patient has no significant tenderness to palpation over the spinous processes or the paraspinous muscles.  She is able to move her neck in all planes without difficulty or significant pain.   CHEST:  The patient demonstrates regular rate and rhythm with no murmurs noted.  Her breathing is unlabored.   ABDOMEN:  The patient's abdomen demonstrates no ecchymosis or sign of acute trauma.  She is nontender to palpation with no masses palpated.   MUSCULOSKELETAL:  The patient demonstrates no significant pain with active range of motion through her bilateral upper extremities.  She does have significant pain with pelvic compression on her left hemipelvis.  She has minimal discomfort with gentle internal and external rotation of the left hip at her left total hip arthroplasty.  She does have mild loss of range of motion on the right hip with internal and external rotation.  The patient also demonstrates pulses over her posterior tibial and dorsalis pedis arteries bilaterally.      IMAGING:  Radiographs of the patient's pelvis from 09/25/2020 are reviewed.  Radiographs demonstrate a high and minimally displaced left superior ramus fracture which extends near the acetabulum and the total hip arthroplasty acetabular shell.  No obvious extension to the components is visualized.  No sign of component loosening or displacement.  No other bony injuries are noted besides the superior ramus fracture.      ASSESSMENT:  Left-sided high superior ramus fracture.      PLAN:  The patient's injury was discussed in detail  with her today.  We discussed that with this type of pelvic fracture we need to take things very slowly as the pelvic fracture is close to the acetabular shell of her total hip arthroplasty.  We discussed that it is possible that there is some extension toward that part of her joint replacement and that we do need to limit her weightbearing to ensure that she does not have loosening of the cup or further problems with her total joint replacement.  We discussed that she will need to be toe-touch weightbearing on the left side while using a walker for 6 weeks.  The patient will require physical and occupational therapy consultation to work on her function and gait.  She will likely also require transitional care unit placement for ongoing safety and mobility training.  The patient voiced understanding to this and therapy will see her today.  All of her questions were answered and the patient will follow-up with Dr. Ihsan Dean in 2 weeks for repeat imaging and evaluation.         IHSAN DEAN MD       As dictated by FARHANA REAVES PA-C            D: 2020   T: 2020   MT: HAROON      Name:     SHAR BANSAL   MRN:      [id number]        Account:       [id number]   :      1926           Consult Date:  2020      Document: [id number]

## 2020-09-27 VITALS
DIASTOLIC BLOOD PRESSURE: 59 MMHG | HEIGHT: 65 IN | RESPIRATION RATE: 18 BRPM | OXYGEN SATURATION: 95 % | BODY MASS INDEX: 23.49 KG/M2 | HEART RATE: 73 BPM | SYSTOLIC BLOOD PRESSURE: 141 MMHG | TEMPERATURE: 97.7 F | WEIGHT: 141 LBS

## 2020-09-27 PROCEDURE — 25000128 H RX IP 250 OP 636: Performed by: INTERNAL MEDICINE

## 2020-09-27 PROCEDURE — 25000132 ZZH RX MED GY IP 250 OP 250 PS 637: Performed by: INTERNAL MEDICINE

## 2020-09-27 PROCEDURE — 40000556 ZZH STATISTIC PERIPHERAL IV START W US GUIDANCE

## 2020-09-27 PROCEDURE — 80048 BASIC METABOLIC PNL TOTAL CA: CPT | Performed by: INTERNAL MEDICINE

## 2020-09-27 PROCEDURE — 99239 HOSP IP/OBS DSCHRG MGMT >30: CPT | Performed by: INTERNAL MEDICINE

## 2020-09-27 RX ORDER — HEPARIN SODIUM 10000 [USP'U]/100ML
700 INJECTION, SOLUTION INTRAVENOUS CONTINUOUS
Status: DISCONTINUED | OUTPATIENT
Start: 2020-09-27 | End: 2020-09-27 | Stop reason: HOSPADM

## 2020-09-27 RX ORDER — FUROSEMIDE 40 MG
40 TABLET ORAL
Status: DISCONTINUED | OUTPATIENT
Start: 2020-09-27 | End: 2020-09-27 | Stop reason: HOSPADM

## 2020-09-27 RX ADMIN — Medication 50 MCG: at 08:54

## 2020-09-27 RX ADMIN — ACETAMINOPHEN 975 MG: 325 TABLET, FILM COATED ORAL at 08:54

## 2020-09-27 RX ADMIN — FUROSEMIDE 40 MG: 40 TABLET ORAL at 08:54

## 2020-09-27 RX ADMIN — ATENOLOL 50 MG: 50 TABLET ORAL at 08:55

## 2020-09-27 RX ADMIN — DOCUSATE SODIUM AND SENNOSIDES 1 TABLET: 8.6; 5 TABLET ORAL at 05:52

## 2020-09-27 RX ADMIN — HEPARIN SODIUM 700 UNITS/HR: 10000 INJECTION, SOLUTION INTRAVENOUS at 10:30

## 2020-09-27 ASSESSMENT — ACTIVITIES OF DAILY LIVING (ADL)
ADLS_ACUITY_SCORE: 29
ADLS_ACUITY_SCORE: 30

## 2020-09-27 NOTE — PLAN OF CARE
VS stable, patient refused to take Tylenol , stating she does not want any more medications and does not have pain. Refused to take Atenolol and Lasix. Incontinent of urine, turned and repositioned. Plan to transferred to [facility] to  see electrophysiologist. Continue to monitor.

## 2020-09-27 NOTE — PROGRESS NOTES
Patient without new complaints, exam stable.  Contacted Dr. Ridley at [facility] and they have agreed to accept patient in transfer for EP cardiology regarding pacemaker pocket infection.  Full d/c summary to follow.  Patient and her daughter updated and in agreement for transfer.  Ambulance being arranged.

## 2020-09-27 NOTE — DISCHARGE SUMMARY
[facility]  Discharge Summary  Hospitalist    Date of Admission:  9/25/2020  Date of Discharge:  9/27/2020  Provider:  Will Denis DO, [facility]    Discharge Diagnoses   1.  Mechanical fall with pelvic fracture  2.  Pacemaker wound/pocket infection    Other medical issues:  Past Medical History:   Diagnosis Date     Anxiety      HTN (hypertension)      Insomnia        History of Present Illness   Marleny Palomares is an 89 year old female who presented after a fall with pelvic/hip pain.  Her history is also significant for a several month issue with wound drainage near her pacemaker.  Please see the admission history and physical for full details.    Hospital Course   Marleny Palomares was admitted on 9/25/2020.  The following problems were addressed during her hospitalization:    Fall/Pelvic fracture:  Ms. Palomares presented after a fall.  She was found to have a pelvic fracture.  Non-operative conservative management recommended by orthopedics.  She will need to be toe-touch weightbearing on the left side while using a walker for approximately 6 weeks.  The patient will require ongoing physical and occupational therapy consultation to work on her function and gait.  She will likely also require transitional care unit placement for ongoing safety and mobility training.  Pain mostly controlled with tylenol currently though she hasn't engaged in substantial activity yet.  She may need some low dose narcotics as she becomes more active with therapy.  She also had a head and neck CT.  No overt injury noted head/neck.  She has an old screw that had a little lucency on CT C-Spine.  If more discomfort develops consider spine surgical eval but not overtly with complaints there now.    Pacemaker wound:  Patient and her daughter states that the pacemaker has been very protuberant over the skin (without skin breakdown) to a degree for years.  For a few months there has been a draining wound next to the pacemaker.  A  few weeks ago her daughter took her to urgent care where the wound was drained and packed.  It then reportedly closed but recently reopened draining yellow discharge mixed with blood.  Patient does not have a fever or other findings to suggest sepsis.  Cultures of wound discharge and blood are pending but negative as of this AM.  I have not placed her on systemic antibiotics given the lack of systemic infection signs.   I had cardiology see her at our facility and they were concerned that this was an evolving pocket infection putting the device at risk.  They recommended she see EP cardiology urgently for treatment.  We do not have EP cardiology available at our facility and transfer was advised.  The patient and her daughter preferred transfer to a local hospital her normal cardiology group ([facility]) covers.    Other issues:  Patient had an episode early in stay of some broad complex tachycardia.  She was completely asymptomatic.  It was not felt this was true Vtach.  Pacemaker appeared functional on interrogation.  Cardiology recommended she remain on her baseline atenolol.  Recommend continued tele monitoring at receiving facility.  The patient also has a prosthetic valve.  She normally is on warfarin which I held last evening given the probable need for a pacemaker procedure.  I started her on a heparin drip without bolus today to provide anticoagulation.  She needs ongoing labs/monitoring for anticoagulation.  The patient has had some speech issues for several months.  She recently had a head CT outpatient to evaluate (She also had another on presentation with the fall).  No other overt focal deficits and she denies acute change in speech the past few days.  I did have a swallow eval done and she appeared to tolerate thin liquids.  The patient is somewhat depressed with her situation/health decline.  She may need this further addressed over the course of her stay.  She denied self harm but  "she is very worried she is burdening her daughter.       Significant Results and Procedures   Routine covid screening NEGATIVE    Pending Results     Unresulted Labs Ordered in the Past 30 Days of this Admission     Date and Time Order Name Status Description    9/26/2020 1550 Blood culture Preliminary     9/26/2020 1550 Blood culture Preliminary     9/26/2020 0921 Miscellaneous Culture Aerobic Bacterial Preliminary           Code Status   Full Code (Patient was vague on wishes so full code for now, a prior note in care everywhere was DNR.)       Primary Care Physician   Dinesh Smith    Blood pressure 133/59, pulse 71, temperature 97.7  F (36.5  C), temperature source Axillary, resp. rate 18, height 1.651 m (5' 5\"), weight 64 kg (141 lb), SpO2 98 %.    Alert, oriented, heart regular, lungs clear, ABD non-tender.  Left chest pacemaker wound dressing with ongoing drainage.    Discharge Disposition   Discharged to [facility]    Consultations This Hospital Stay   PHARMACY TO DOSE WARFARIN  ORTHOPEDIC SURGERY IP CONSULT  CARDIOLOGY IP CONSULT  SWALLOW EVAL SPEECH PATH AT BEDSIDE IP CONSULT  SPEECH LANGUAGE PATH ADULT IP CONSULT  PHARMACY TO DOSE WARFARIN  PHYSICAL THERAPY ADULT IP CONSULT  OCCUPATIONAL THERAPY ADULT IP CONSULT  PHARMACY TO DOSE HEPARIN    Time Spent on this Encounter   I, Will Denis DO, personally saw the patient today and spent greater than 30 minutes discharging this patient.    Discharge Orders     Discharge Medications   Current Facility-Administered Medications   Medication     acetaminophen (TYLENOL) tablet 975 mg     atenolol (TENORMIN) tablet 50 mg     furosemide (LASIX) tablet 40 mg     heparin 25,000 units in 0.45% NaCl 250 mL ANTICOAGULANT  infusion     HYDROmorphone (PF) (DILAUDID) injection 0.3-0.5 mg     lidocaine (LMX4) cream     lidocaine 1 % 0.1-1 mL     LORazepam (ATIVAN) tablet 0.5 mg     melatonin tablet 1 mg     naloxone (NARCAN) injection 0.1-0.4 mg     ondansetron " (ZOFRAN-ODT) ODT tab 4 mg    Or     ondansetron (ZOFRAN) injection 4 mg     oxyCODONE IR (ROXICODONE) half-tab 2.5 mg     Patient is already receiving anticoagulation with heparin, enoxaparin (LOVENOX), warfarin (COUMADIN)  or other anticoagulant medication     senna-docusate (SENOKOT-S/PERICOLACE) 8.6-50 MG per tablet 1 tablet    Or     senna-docusate (SENOKOT-S/PERICOLACE) 8.6-50 MG per tablet 2 tablet     sodium chloride (PF) 0.9% PF flush 3 mL     sodium chloride (PF) 0.9% PF flush 3 mL     Vitamin D3 (CHOLECALCIFEROL) tablet 50 mcg     Warfarin Therapy Reminder (Check START DATE - warfarin may be starting in the FUTURE)   \      Allergies   Allergies   Allergen Reactions     Penicillins Hives     Data   Recent Labs   Lab 09/25/20  2301   WBC 6.2   HGB 14.7   HCT 48.1*   MCV 90        Recent Labs   Lab 09/26/20  1850 09/26/20  1608 09/26/20  1606   CULT PENDING No growth after 10 hours No growth after 10 hours     Recent Labs   Lab 09/26/20  1125 09/25/20  2301    139   POTASSIUM 4.0 4.1   CHLORIDE 108 106   CO2 24 28   ANIONGAP 6 5   GLC 87 96   BUN 14 19   CR 0.53 0.70   GFRESTIMATED 81 74   GFRESTBLACK >90 86   ARIELLA 8.4* 9.4   MAG 2.1 2.3   PROTTOTAL  --  8.0   ALBUMIN  --  4.1   BILITOTAL  --  0.9   ALKPHOS  --  86   AST  --  35   ALT  --  23     COVID-19 PCR Results    COVID-19 PCR Results 9/26/20 9/26/20    0938 0938   COVID-19 Virus PCR to [facility] - Result Test received-See reflex to IDDL test SARS CoV2 (COVID-19) Virus RT-PCR    COVID-19 Virus PCR to [facility] - Source Nasopharyngeal    SARS-CoV-2 Virus Specimen Source  Nasopharyngeal   SARS-CoV-2 PCR Result  NEGATIVE      Comments are available for some flowsheets but are not being displayed.         COVID-19 Antibody Results, Testing for Immunity    COVID-19 Antibody Results, Testing for Immunity   No data to display.           Results for orders placed or performed during the hospital encounter of 09/25/20   CT Head w/o Contrast    Narrative     EXAM: CT HEAD W/O CONTRAST  LOCATION: [facility]  DATE/TIME: 9/25/2020 11:05 PM    INDICATION: Head trauma  COMPARISON: 09/21/2020.  TECHNIQUE: Routine without IV contrast. Multiplanar reformats. Dose reduction techniques were used.    FINDINGS:  INTRACRANIAL CONTENTS: No intracranial hemorrhage, extraaxial collection, or mass effect.  No CT evidence of acute infarct. There is diffuse scattered low-attenuation within the periventricular and subcortical white matter consistent with diffuse small   vessel ischemic disease. The ventricular system, basal cisterns and the cortical sulci are consistent with diffuse volume loss.      VISUALIZED ORBITS/SINUSES/MASTOIDS: No intraorbital abnormality. No paranasal sinus mucosal disease. No middle ear or mastoid effusion.    BONES/SOFT TISSUES: No acute abnormality.      Impression    IMPRESSION:  1.  No CT finding of a mass, hemorrhage or focal area suggestive of acute infarct.  2.  Diffuse age related changes.   CT Cervical Spine w/o Contrast    Narrative    EXAM: CT CERVICAL SPINE W/O CONTRAST  LOCATION: [facility]  DATE/TIME: 9/25/2020 11:09 PM    INDICATION: Status post fall with neck pain.  COMPARISON: 05/03/2020.  TECHNIQUE: CT cervical spine without contrast. Dose reduction techniques were performed.    FINDINGS: There is good anatomic alignment of the C1 and C2 vertebral bodies and also with the occipital condyles. The prevertebral soft tissues and the predental space are maintained. There is a stable slight retrolisthesis of C4 in relation to C5 with   the rest of the cervical spine having good anatomic alignment. The vertebral body heights are well-maintained throughout. The patient is status post screw fixation of the type II odontoid fracture with the fracture alignment maintained. There is no   significant callous formation along the fracture and there is further lucency surrounding the screw. There is no evidence of an acute  cervical spine fracture. There is prominent degenerative disc disease from the C2-C3 to the C6-C7 disc space level.   These levels have a significant loss of disc space height, endplate changes and small anterior osteophyte formations. There is diffuse facet arthropathy throughout the cervical region. There is no significant canal compromise or significant neural   foraminal narrowing throughout the cervical region. The lung apices are clear. The paraspinal soft tissues are unremarkable.      Impression    IMPRESSION:  1. No evidence of an acute cervical spine fracture.  2. Alignment and vertebral body heights stable in the interval.  3. No significant canal compromise or neural foraminal narrowing throughout the cervical region.  4. 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 There is a lucency surrounding the screw. Recommend clinical correlation for loosening.   Lumbar spine XR, 2-3 views    Narrative    EXAM: XR LUMBAR SPINE 2-3 VIEWS  LOCATION: [facility]  DATE/TIME: 9/25/2020 11:26 PM    INDICATION: Fall. Trauma.  COMPARISON: None.      Impression    IMPRESSION: Decreased osseous mineralization. There is a subtle fracture involving the left superior pubic ramus. The patient is status post left hip arthroplasty. Degenerative changes are noted involving the right hip. Advanced multilevel degenerative   changes are noted involving the lumbar spine. Additionally there is bilateral sacroiliac joint osteoarthritis. There is approximately 5 mm of retrolisthesis of L1 on L2, chronic and degenerative. Vertebral body heights are maintained. No acute fracture   is identified. Atheromatous vascular calcification of abdominal aorta.   Thoracic spine XR, 3 views    Narrative    EXAM: XR THORACIC SPINE 3 VW  LOCATION: [facility]  DATE/TIME: 9/25/2020 11:18 PM    INDICATION: Trauma. Fall. Back pain.  COMPARISON: CT chest dated 02/16/2018.      Impression    IMPRESSION:  Decreased osseous mineralization. Within the confines of this examination: Subtle superior endplate compression deformity noted involving the presumed T12 vertebral body, age indeterminate. Alignment is maintained. The remaining vertebral   body heights are maintained. No acute malalignment or acute displaced fracture identified. Multilevel degenerative changes are present involving thoracic line. The patient is status post median sternotomy and valve repair. A left-sided dual-lead AICD is   present.    XR Pelvis and Hip Left 2 Views    Narrative    EXAM: XR PELVIS AND HIP LEFT 2 VIEWS  LOCATION: [facility]  DATE/TIME: 9/25/2020 11:19 PM    INDICATION: Pain.  COMPARISON: 12/17/2016      Impression    IMPRESSION: Left hip arthroplasty. Fracture of the left superior pubic ramus. CT correlation to evaluate for additional fracture recommended. Degenerative changes. Vascular calcification.   XR Chest 1 View    Narrative    EXAM: XR CHEST 1 VW  LOCATION: [facility]  DATE/TIME: 9/25/2020 11:19 PM    INDICATION: Injury. Pain  COMPARISON: 06/07/2019      Impression    IMPRESSION: Cardiac enlargement. Median sternotomy and cardiac valve replacement. Dual cardiac leads. Chronic right basilar infiltrate and chronic right costophrenic angle blunting. Appearance similar to previous.

## 2020-09-27 NOTE — PLAN OF CARE
"SLP: Note patient transferring to [facility] today. Will defer SLP to next level of care. SLP to follow for dysphagia and assess tolerance of diet and competence with use of strategies. Patient also presents with an ataxic dysarthria, falls, and c/o incoordination with hands all suspicious for cerebellar involvement. Patient was wanting answers but also stating \"I'm 89 so in the long run, does it matter?\"  "

## 2020-09-27 NOTE — PLAN OF CARE
Vss A&O x4. Slow speech. Baseline per pt. Pain with movement. Declined pain meds. Did take tylenol.small bleeding to skin below pacemaker. Tele afib cvr demand vpaced. Denies light headed or dizzijness. Tolerated up to chair A1 gait belt walker pivot transfer. Good appetite. Sats stable on 1.5L NC. Sob with exertion. Refused Am labs Dr. Denis aware and states ok to wait and check Hep 10 at [facility]. Pt aware of poc. Heparin was started late d/t loss of iv access. Report called to Yesenia KNOWLES at [facility]. Daughter Kiah was notified of pt transferring.

## 2020-09-27 NOTE — PLAN OF CARE
"Pt admitted with a L hip fracture d/t fall in her apartment. Pt has a wound to her protruding pacemaker site, small amount of serosanguineous drainage noted. Pt denied any pain but did grimace with repositioning. Declined any analgesia. /42 (BP Location: Left arm)   Pulse 89   Temp 98.1  F (36.7  C) (Oral)   Resp 16   Ht 1.651 m (5' 5\")   Wt 64 kg (141 lb)   SpO2 94%   BMI 23.46 kg/m   Pt on 1.5 LPM during shift; O2 sat of 89% on RA. Tele A fib CVR demand V-paced, piv saline locked. Pt to transfer to [facility] today 9/27 for interrogation of abnormal pacemaker. Will continue with poc.  "

## 2020-09-27 NOTE — CONSULTS
"   INPATIENT CARDIOLOGY CONSULTATION   [facility].  Consult Date:  09/26/2020      REFERRAL SOURCE:  Will Denis DO      REASON FOR VISIT:    1. Abnormal pacemaker site with an open wound.   2. Asymptomatic run of broad complex tachycardia on telemetry.     HISTORY OF PRESENT ILLNESS:    Marleny Palomares as a 89-year-old, extremely frail-appearing  lady.  History was obtained from her and corroborated by her daughter, who is an operating room nurse at [employer] and her power of .     Medical history is significant for mechanical [place] mitral valve replacement in 2000 (valve size not known), atrial fibrillation, on warfarin anticoagulation with a INR goal of 2.5-3.5, status post dual-chamber pacemaker implanted in 2013 (Medtronic, MRI compatible), pulmonary hypertension (echo estimation of RVSP approximately 60 mmHg), mild left ventricular systolic dysfunction (last LVEF 53%), mild right ventricular systolic dysfunction, essential hypertension, recent history of balance issues and frequent falls.  Patient follows with the cardiology at [facility] and last saw Dr. Femi Domingo in 2018.     The patient lives on her own in her home and has frequent falls.  She was admitted with a fall without loss of consciousness and has sustained a pelvic fracture.      Cardiology is being consulted for 2 issues:    1.  Pacemaker site drainage.  The patient had a Medtronic pacemaker implanted in [place] on 8/13/13 Medtronic A2DR01- MRI compatible - serial # [id number] (Place in [facility] R atrial lead is Model # 5086 MRI Serial # [id number], R Ventrical lead Model # 5086 MRI Serial # [id number].      Patient's daughter states that the pacemaker has been very protuberant over the skin (without skin breakdown) for several years but \"no cardiologist wanted to touch it.\"  Over the last few months there has been a draining wound.  A few weeks ago her daughter took her " to urgent care where the wound was drained and packed.  Patient does not have a fever or any other constitutional symptoms.     On exam, her pacemaker is very superficial and is almost protuberant and she has an openly discharging wound with the opening about the size of a pea.  There is blood and some slightly purulent discharge coming out of there.     2.  an episode of broad complex tachycardia noted on telemetry   She was noted to have an asymptomatic brief run of wide complex tachycardia.  Her pacemaker was interrogated, but details are not immediately available.  The tracings appear like a run of ventricular tachycardia.      ECG shows atrial fibrillation with occasional ventricular paced complex.    Last echocardiogram (care everywhere) 2/2018 showed mildly decreased left ventricular systolic function with a LVEF of 53%, moderately enlarged right ventricle with mildly decreased systolic function, well-functioning mechanical mitral valve with a mean diastolic gradient of 3 mmHg, significant pulmonary hypertension with an estimated RVSP of 60 mmHg plus right atrial pressure.        PHYSICAL EXAMINATION:   GENERAL:  She is a very frail-appearing, extremely hard of hearing, relatively alert and oriented x 3 and able to give a reasonable history.     CHEST:  Her pacemaker site exam is very dramatic.  The generator seems to be protruding about 1.5-2 cm above the surface of the skin without any breach of skin immediately over it, but inferior to the pacemaker site there is an open wound about the size of a pea with serosanguineous discharge with bloody purulent discharge.   CARDIOVASCULAR:  Heart sounds are irregularly regular.   LUNGS:  Clear to auscultation.     EXTREMITIES:  There is no lower extremity edema.      DIAGNOSES:   1.  Abnormal pacemaker site with open wound.   2.  Status post mechanical mitral valve replacement, on chronic warfarin anticoagulation with a goal INR of 2.5-3.5.   3.  Dual-chamber  pacemaker in situ, placed  in [place].   4.  Atrial fibrillation.  5.  Elderly frailty with recurrent falls.      ASSESSMENT/PLAN:    I had a long conversation with the patient, her power of  and daughter (Kiah Pickett) and primary hospitalist, Dr. Denis.      Her pacemaker site is very abnormal.  In addition, it has an open wound that is discharging blood and possibly pus.  This has been ongoing for several months.  I do not believe she has a systemic infection and I am not sure if antibiotics alone are going to clear this.  I think she will need exploration and possibly reimplantation of the pacemaker.  It appears to be at risk of imminent skin breakage over the pacemaker.     1.  I offered that the patient be transferred to [facility] for evaluation by one of our electrophysiologists.  Daughter prefers that her mother be transferred to [facility] where she has received her previous cardiac care.  Discussed with Dr. Denis who will make the arrangements tomorrow.   2.  Blood cultures recommended.  3.  She did have a brief run of what appears to be ventricular tachycardia, but  was not symptomatic during that run, and is already on a pretty high dose of atenolol 50 mg 2 times daily with a normal creatinine of 0.53   4.  At this time I am not making any changes to her medications.       Thank you for consulting Cardiology.         JUWAN ULRICH MD             D: 2020   T: 2020   MT: RYLIE      Name:     SHAR BANSAL   MRN:      [id number]        Account:       [id number]   :      1926           Consult Date:  2020      Document: [id number]       cc: Dinesh Ulrich MD

## 2020-09-28 LAB
BACTERIA SPEC CULT: NORMAL
Lab: NORMAL
SPECIMEN SOURCE: NORMAL

## 2020-09-29 NOTE — CONSULTS
Consult Date:  09/26/2020      Inpatient orthopedic consultation is requested by Dr. Denis for the patient's left-sided pelvic fracture.      CHIEF COMPLAINT:  Pelvic pain and inability to bear weight following mechanical fall.      HISTORY OF PRESENT ILLNESS:  Marleny is a pleasant 89-year-old female who usually lives alone in her home and reported a fall yesterday on 09/25/2020.  Following the fall the patient was unable to bear weight and localized pain to her left hemipelvis and hip area.  She pulled her Lifeline cord and medics arrived at her home.  The patient was transported to the [facility] Emergency Department where x-ray imaging revealed a left superior ramus fracture adjacent to her left total hip arthroplasty.  The patient was then admitted for ongoing cares and orthopedic consultation.  At this time, the patient lies comfortably in her hospital bed.  She denies any other symptoms at this time except for left-sided pelvic pain.      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  History of knee replacement.  The patient also has a history of hypertension, atrial fibrillation, chronic anticoagulation, pacemaker implantation and mitral valve replacement.      SOCIAL HISTORY:  The patient does not smoke or use alcohol.  She does not use drugs.  She lives alone at her home.      FAMILY HISTORY:  Noncontributory to the current injury.      MEDICATIONS:  The patient takes losartan, atenolol, furosemide, lorazepam, Tramadol, vitamin D, and Coumadin.      ALLERGIES:  THE PATIENT IS ALLERGIC TO PENICILLIN.      REVIEW OF SYSTEMS:  The patient denies headache, changes in vision or hearing, neck pain, chest pain, shortness of breath, upper extremity pain, abdominal pain or pain down her lower legs.  She does admit to pain on the left side of her pelvis.      PHYSICAL EXAMINATION:  The patient is a  well-developed, well-nourished 89-year-old female who appears her stated age, in no acute distress while lying in her hospital bed.   VITAL SIGNS:  Temperature 97.5 Fahrenheit, pulse 84, blood pressure 134/66, respirations 18, SpO2 96% on 2 liters per minute oxygen delivered via nasal cannula.   HEENT:  The patient is normocephalic without significant trauma to the head.  No ecchymosis or laceration is noted.   NECK:  The patient has no significant tenderness to palpation over the spinous processes or the paraspinous muscles.  She is able to move her neck in all planes without difficulty or significant pain.   CHEST:  The patient demonstrates regular rate and rhythm with no murmurs noted.  Her breathing is unlabored.   ABDOMEN:  The patient's abdomen demonstrates no ecchymosis or sign of acute trauma.  She is nontender to palpation with no masses palpated.   MUSCULOSKELETAL:  The patient demonstrates no significant pain with active range of motion through her bilateral upper extremities.  She does have significant pain with pelvic compression on her left hemipelvis.  She has minimal discomfort with gentle internal and external rotation of the left hip at her left total hip arthroplasty.  She does have mild loss of range of motion on the right hip with internal and external rotation.  The patient also demonstrates pulses over her posterior tibial and dorsalis pedis arteries bilaterally.      IMAGING:  Radiographs of the patient's pelvis from 09/25/2020 are reviewed.  Radiographs demonstrate a high and minimally displaced left superior ramus fracture which extends near the acetabulum and the total hip arthroplasty acetabular shell.  No obvious extension to the components is visualized.  No sign of component loosening or displacement.  No other bony injuries are noted besides the superior ramus fracture.      ASSESSMENT:  Left-sided high superior ramus fracture.      PLAN:  The patient's injury was discussed in detail  with her today.  We discussed that with this type of pelvic fracture we need to take things very slowly as the pelvic fracture is close to the acetabular shell of her total hip arthroplasty.  We discussed that it is possible that there is some extension toward that part of her joint replacement and that we do need to limit her weightbearing to ensure that she does not have loosening of the cup or further problems with her total joint replacement.  We discussed that she will need to be toe-touch weightbearing on the left side while using a walker for 6 weeks.  The patient will require physical and occupational therapy consultation to work on her function and gait.  She will likely also require transitional care unit placement for ongoing safety and mobility training.  The patient voiced understanding to this and therapy will see her today.  All of her questions were answered and the patient will follow-up with Dr. Mayank Stinson in 2 weeks for repeat imaging and evaluation.      Revised Signing Clinician lg 20            EMMA WEAVER MD       As dictated by FARHANA REAVES PA-C            D: 2020   T: 2020   MT: HAROON      Name:     SHAR BANSAL   MRN:      [id number]        Account:       [id number]   :      1926           Consult Date:  2020      Document: [id number]

## 2020-10-02 LAB
BACTERIA SPEC CULT: NO GROWTH
BACTERIA SPEC CULT: NO GROWTH
SPECIMEN SOURCE: NORMAL
SPECIMEN SOURCE: NORMAL

## 2020-10-21 ENCOUNTER — RECORDS - HEALTHEAST (OUTPATIENT)
Dept: LAB | Facility: CLINIC | Age: 85
End: 2020-10-21

## 2020-10-22 LAB
ANION GAP SERPL CALCULATED.3IONS-SCNC: 7 MMOL/L (ref 5–18)
BUN SERPL-MCNC: 16 MG/DL (ref 8–28)
CALCIUM SERPL-MCNC: 8.9 MG/DL (ref 8.5–10.5)
CHLORIDE BLD-SCNC: 106 MMOL/L (ref 98–107)
CO2 SERPL-SCNC: 27 MMOL/L (ref 22–31)
CREAT SERPL-MCNC: 0.52 MG/DL (ref 0.6–1.1)
ERYTHROCYTE [DISTWIDTH] IN BLOOD BY AUTOMATED COUNT: 16.1 % (ref 11–14.5)
GFR SERPL CREATININE-BSD FRML MDRD: >60 ML/MIN/1.73M2
GLUCOSE BLD-MCNC: 95 MG/DL (ref 70–125)
HCT VFR BLD AUTO: 36.9 % (ref 35–47)
HGB BLD-MCNC: 11.2 G/DL (ref 12–16)
MCH RBC QN AUTO: 27.8 PG (ref 27–34)
MCHC RBC AUTO-ENTMCNC: 30.4 G/DL (ref 32–36)
MCV RBC AUTO: 92 FL (ref 80–100)
PLATELET # BLD AUTO: 267 THOU/UL (ref 140–440)
PMV BLD AUTO: 9.8 FL (ref 8.5–12.5)
POTASSIUM BLD-SCNC: 3.9 MMOL/L (ref 3.5–5)
RBC # BLD AUTO: 4.03 MILL/UL (ref 3.8–5.4)
SODIUM SERPL-SCNC: 140 MMOL/L (ref 136–145)
WBC: 5 THOU/UL (ref 4–11)

## 2020-11-19 ENCOUNTER — HOSPITAL ENCOUNTER (EMERGENCY)
Facility: CLINIC | Age: 85
Discharge: HOME | End: 2020-11-19
Attending: EMERGENCY MEDICINE | Admitting: EMERGENCY MEDICINE
Payer: COMMERCIAL

## 2020-11-19 VITALS
TEMPERATURE: 98.8 F | SYSTOLIC BLOOD PRESSURE: 158 MMHG | DIASTOLIC BLOOD PRESSURE: 88 MMHG | OXYGEN SATURATION: 96 % | HEART RATE: 90 BPM

## 2020-11-19 DIAGNOSIS — R04.0 EPISTAXIS: ICD-10-CM

## 2020-11-19 LAB
ERYTHROCYTE [DISTWIDTH] IN BLOOD BY AUTOMATED COUNT: 14.9 % (ref 10–15)
HCT VFR BLD AUTO: 41.9 % (ref 35–47)
HGB BLD-MCNC: 12.8 G/DL (ref 11.7–15.7)
INR PPP: 2.57 (ref 0.86–1.14)
MCH RBC QN AUTO: 27.8 PG (ref 26.5–33)
MCHC RBC AUTO-ENTMCNC: 30.5 G/DL (ref 31.5–36.5)
MCV RBC AUTO: 91 FL (ref 78–100)
PLATELET # BLD AUTO: 262 10E9/L (ref 150–450)
RBC # BLD AUTO: 4.6 10E12/L (ref 3.8–5.2)
WBC # BLD AUTO: 6.5 10E9/L (ref 4–11)

## 2020-11-19 PROCEDURE — 250N000009 HC RX 250: Performed by: EMERGENCY MEDICINE

## 2020-11-19 PROCEDURE — 85610 PROTHROMBIN TIME: CPT | Performed by: EMERGENCY MEDICINE

## 2020-11-19 PROCEDURE — 85027 COMPLETE CBC AUTOMATED: CPT | Performed by: EMERGENCY MEDICINE

## 2020-11-19 PROCEDURE — 99283 EMERGENCY DEPT VISIT LOW MDM: CPT | Mod: 25

## 2020-11-19 PROCEDURE — 272N000018 HC KIT NASAL PACKING

## 2020-11-19 PROCEDURE — 30901 CONTROL OF NOSEBLEED: CPT | Mod: RT

## 2020-11-19 RX ORDER — OXYMETAZOLINE HYDROCHLORIDE 0.05 G/100ML
2 SPRAY NASAL ONCE
Status: COMPLETED | OUTPATIENT
Start: 2020-11-19 | End: 2020-11-19

## 2020-11-19 RX ORDER — TRANEXAMIC ACID 100 MG/ML
500 INJECTION, SOLUTION INTRAVENOUS ONCE
Status: COMPLETED | OUTPATIENT
Start: 2020-11-19 | End: 2020-11-19

## 2020-11-19 RX ADMIN — TRANEXAMIC ACID 500 MG: 1 INJECTION, SOLUTION INTRAVENOUS at 02:57

## 2020-11-19 RX ADMIN — OXYMETAZOLINE HYDROCHLORIDE 2 SPRAY: 5 SPRAY NASAL at 02:56

## 2020-11-19 ASSESSMENT — ENCOUNTER SYMPTOMS
FEVER: 0
VOMITING: 0
SHORTNESS OF BREATH: 0

## 2020-11-19 NOTE — ED AVS SNAPSHOT
[facility]  [address]  [facility] [address]  Phone: [phone]  Fax: [phone]                                    Marleny Palomares   MRN: [id number]    Department: [facility]   Date of Visit: 11/19/2020           After Visit Summary Signature Page    I have received my discharge instructions, and my questions have been answered. I have discussed any challenges I see with this plan with the nurse or doctor.    ..........................................................................................................................................  Patient/Patient Representative Signature      ..........................................................................................................................................  Patient Representative Print Name and Relationship to Patient    ..................................................               ................................................  Date                                   Time    ..........................................................................................................................................  Reviewed by Signature/Title    ...................................................              ..............................................  Date                                               Time          22EPIC Rev 08/18

## 2020-11-19 NOTE — ED TRIAGE NOTES
Pt BIBA from assisted living for epitaxis starting @ midnight. Pt on warfarin for Afib. AAOx4. ABCs intact.

## 2020-11-19 NOTE — DISCHARGE INSTRUCTIONS
"There is a special type of dressing in the nose called Surgifoam.  This will dissolve with time and does not need to be removed.    Discharge Instructions  Epistaxis    Today you were seen for a nosebleed.   Nosebleeds (the medical term is \"epistaxis\") are very common. Almost every person has had at least one in their lifetime.  Although the amount of blood loss can appear dramatic, nosebleeds rarely cause serious problems.  The most common causes are dry air or nose picking, but they also are common in people who have allergies, high blood pressure, or are on blood thinners (such as Coumadin, Aspirin or Plavix). If you or your child gets a nosebleed, the important thing is to know how to take care of it. With the right self-care, most nosebleeds will stop on their own.    Generally, every Emergency Department visit should have a follow-up clinic visit with either a primary or a specialty clinic/provider. Please follow-up as instructed by your emergency provider today.    Return to the Emergency Department if:  Your nose is bleeding a very large amount of blood and you are unable to stop it.  You get very pale, faint, or tired.  You cannot get the bleeding to stop after following these instructions.    Treatment:  Your provider may tell you to use a decongestant nose spray, like Afrin  (oxymetazoline), in both nostrils in the morning and at night for the next three days. Do not use this medicine for more than three days at a time.  If you do, it will cause nasal congestion.   Use a moisturizer. A small amount of Vaseline  to the inside of your nostrils for moisture before bed is one option. There are nasal sprays available over-the-counter for this purpose as well.  Using a humidifier in your bedroom or home will help as well when the air is dry.  For the next three days, do not blow your nose or put anything in your nose. You may sniffle, or dab the outside of your nose.  Do not bend with your head below your waist " for the next three days. Do not lift anything so heavy that you have to strain.   If you received nasal packing, please do not remove the packing until seen by an Ear, Nose, and Throat (ENT) specialist.  If antibiotics have been given with the packing, please take as directed.    If your nose starts to bleed again:  Blow your nose to get rid of some of the clots that have formed inside your nostrils. This may increase the bleeding temporarily, but that is okay.  Spray decongestant nose spray (like Afrin ) into both nostrils to constrict the blood vessels.  Sit or stand while bending forward slightly at the waist. Do not lie down or tilt your head back. This may cause you to swallow blood and can lead to vomiting.   the soft part of BOTH nostrils at the bottom of your nose and squeeze your nose closed for at least 5 minutes (for children) or 10 to 15 minutes (for adults). Use a clock to time yourself. Do not release the pressure every so often to check whether the bleeding has stopped. Many people hurt their chances of stopping the bleeding by releasing the pressure too soon or too often.    If you follow the steps outlined above, and your nose continues to bleed, repeat all the steps once more. Apply pressure for a total of at least 30 minutes. If you continue to bleed even then, seek medical attention.  If you were given a prescription for medicine here today, be sure to read all of the information (including the package insert) that comes with your prescription.  This will include important information about the medicine, its side effects, and any warnings that you need to know about.  The pharmacist who fills the prescription can provide more information and answer questions you may have about the medicine.  If you have questions or concerns that the pharmacist cannot address, please call or return to the Emergency Department.   Remember that you can always come back to the Emergency Department if you are not  able to see your regular provider in the amount of time listed above, if you get any new symptoms, or if there is anything that worries you.

## 2020-11-19 NOTE — ED NOTES
Report given to Maral BROWN at [facility]. No questions at this time. Wheel chair transport arrived to bring pt home.

## 2020-11-19 NOTE — ED NOTES
"Spoke with daughter Kiah who was unaware that pt was in the ED. After a brief update Kiah was asked about pts reluctance to return to her assisted living facility. Kiah stated \"she doesn't like it there but she can go back, she doesn't have any where else to go. She would much rather be in her own apartment but she was falling\" Informed daughter that we would arrange for w/c transport back to assisted living.   "

## 2020-11-19 NOTE — ED PROVIDER NOTES
History     Chief Complaint:    Epistaxis      HPI     Marleny Palomares is a 89 year old female who presents with epistaxis.  Patient reports that approximately 2 to 3 hours prior to arrival she had the atraumatic onset of epistaxis.  The bleeding was more severe on the right.  She otherwise feels well.  She is on warfarin for history of heart valve replacement.  She denies any additional areas of bleeding such as hematemesis, melena, hematochezia or hematuria.  She has no history of significant epistaxis and has never been seen in the ED for this problem.    Allergies:  Penicillins    Medications:    Norvasc  Coumadin  Toprol  Tenormin  Cleocin  Lasix  Ativan  Senokot    Past Medical History:    Anxiety  Hypertension  Pacemaker infection  Closed fracture of right inferior pubic ramus  Acuter exacerbation of CHF  Pneumonia- community acquired  Mild CAD  Atrial fibrillation  Depression  IBS  Breast cancer  Cervical spine fracture  Gastroenteritis    Past Surgical History:    Mitral valve replacement  Appendectomy  Cholecystectomy  Cataract removal  Mitral valvuloplasty  Thoracentesis  Pacemaker placement  Cervical fusion  Total knee arthroplasty  Breast lumpectomy  Hip replacement    Family History:    Father: CHD, MI  Mother: Breast cancer, stroke, atrial fibrillation    Social History:  The patient was accompanied to the ED by EMS.  Smoking Status: Never Smoker  Smokeless Tobacco: Never Used  Alcohol Use: Positive  Drug Use: Negative  PCP: Dinesh Smith    Marital Status:       Review of Systems   Constitutional: Negative for fever.   Respiratory: Negative for shortness of breath.    Cardiovascular: Negative for chest pain.   Gastrointestinal: Negative for vomiting.   All other systems reviewed and are negative.    Physical Exam     Patient Vitals for the past 24 hrs:   BP Temp Temp src Pulse SpO2   11/19/20 0238 (!) 158/88 98.8  F (37.1  C) Temporal 90 96 %       Physical Exam    General:   Pleasant,  age appropriate sitting upright with nasal clamps in place.  HEENT:    Ongoing venous bleeding present in the right > left naris.      Active venous bleeding from Kiesselbach's plexus on the right.      No mass or lesion noted.     Oropharynx is moist, without lesions or trismus.     + blood in the posterior pharynx with clots.  Eyes:    Conjunctiva normal, PERRL  Neck:    Supple, no meningismus.     CV:     Regular rate and rhythm.      No murmurs, rubs or gallops.  PULM:    Clear to auscultation bilateral.       No respiratory distress.      Good air exchange.  ABD:    Soft, non-tender, non-distended.       No pulsatile masses.       No rebound, guarding or rigidity.  MSK:     No gross deformity to all four extremities.   LYMPH:   No cervical lymphadenopathy.  NEURO:   Alert, good muscular tone, no atrophy.   Skin:    Warm, dry and intact.    Psych:    Mood is good and affect is appropriate.      Emergency Department Course     Laboratory:  Laboratory findings were communicated with the patient who voiced understanding of the findings.    CBC: WBC: 6.5, HGB: 12.8, PLT: 262    INR: 2.57 (H)     Procedures    Procedure: Epistaxis Management  Performed by: Dr. Castillo Ramírez    Technique: Afrin nasal was sprayed in the nares bilaterally followed by packing the nares with cotton balls soaked in tranexamic acid for 15 minutes.  On examination, bleeding was isolated to the right.  Silver nitrate cautery was then applied to the area of bleeding.  There was a mild bout of venous bleeding that persisted.  A piece of Surgifoam with tissue adhesive was then applied to the area with adequate hemostasis.  On re-examination 20 minutes later there is no residual bleeding externally at the naris or in the posterior pharynx.    Complications: none    Interventions:  0256 Afrin 2 sprays Nasal  0257 Cyklokapron 500 mg Both Nostrils    Emergency Department Course:  Past medical records, nursing notes, and vitals  reviewed.    0243 I performed an exam of the patient as documented above.     IV was inserted and blood was drawn for laboratory testing, results above.    0425 I rechecked the patient and discussed the results of her workup thus far.     Findings and plan explained to the Patient and daughter. Patient discharged home with instructions regarding supportive care, medications, and reasons to return. The importance of close follow-up was reviewed.    I personally reviewed the laboratory results with the Patient and answered all related questions prior to discharge.     Impression & Plan     Medical Decision Makin-year-old female presented to the ED with epistaxis.  Area was isolated to Kiesselbach's plexus on the right.  Patient had improvement with Afrin and Tranexamic acid but with mild continued bleeding.  Silver nitrate applied with mild venous oozing.  A piece of Surgifoam adhered with tissue adhesive was then applied with adequate hemostasis and no evidence of recurrent bleeding.  INR is therapeutic at 2.5.  Patient safe for discharge home.  Return to the ED for worsening symptoms.      Diagnosis:    ICD-10-CM    1. Epistaxis  R04.0        Disposition:  Discharged to home.    Scribe Disclosure:  I, Magno Dahl, am serving as a scribe at 2:52 AM on 2020 to document services personally performed by Castillo Ramírez MD based on my observations and the provider's statements to me.        Castillo Ramírez MD  20 0747